# Patient Record
Sex: MALE | Race: BLACK OR AFRICAN AMERICAN | Employment: FULL TIME | URBAN - METROPOLITAN AREA
[De-identification: names, ages, dates, MRNs, and addresses within clinical notes are randomized per-mention and may not be internally consistent; named-entity substitution may affect disease eponyms.]

---

## 2020-07-12 ENCOUNTER — HOSPITAL ENCOUNTER (EMERGENCY)
Age: 35
Discharge: HOME | End: 2020-07-12
Attending: EMERGENCY MEDICINE
Payer: MEDICAID

## 2020-07-12 VITALS
TEMPERATURE: 97.8 F | DIASTOLIC BLOOD PRESSURE: 67 MMHG | HEIGHT: 68 IN | SYSTOLIC BLOOD PRESSURE: 115 MMHG | RESPIRATION RATE: 16 BRPM | OXYGEN SATURATION: 99 % | WEIGHT: 163 LBS | HEART RATE: 73 BPM | BODY MASS INDEX: 24.71 KG/M2

## 2020-07-12 DIAGNOSIS — L30.9 ECZEMA, UNSPECIFIED TYPE: Primary | ICD-10-CM

## 2020-07-12 DIAGNOSIS — L02.91 ABSCESS: ICD-10-CM

## 2020-07-12 PROCEDURE — 99282 EMERGENCY DEPT VISIT SF MDM: CPT

## 2020-07-12 RX ORDER — CEPHALEXIN 500 MG/1
1000 CAPSULE ORAL 2 TIMES DAILY
Qty: 40 CAP | Refills: 0 | Status: SHIPPED | OUTPATIENT
Start: 2020-07-12 | End: 2020-07-22

## 2020-07-12 RX ORDER — METHYLPREDNISOLONE 4 MG/1
TABLET ORAL
Qty: 1 DOSE PACK | Refills: 1 | Status: SHIPPED | OUTPATIENT
Start: 2020-07-12 | End: 2021-04-07 | Stop reason: CLARIF

## 2020-07-12 NOTE — DISCHARGE INSTRUCTIONS
Patient Education        Dermatitis: Care Instructions  Your Care Instructions  Dermatitis is the general name used for any rash or inflammation of the skin. Different kinds of dermatitis cause different kinds of rashes. Common causes of a rash include new medicines, plants (such as poison oak or poison ivy), heat, and stress. Certain illnesses can also cause a rash. An allergic reaction to something that touches your skin, such as latex, nickel, or poison ivy, is called contact dermatitis. Contact dermatitis may also be caused by something that irritates the skin, such as bleach, a chemical, or soap. These types of rashes cannot be spread from person to person. How long your rash will last depends on what caused it. Rashes may last a few days or months. Follow-up care is a key part of your treatment and safety. Be sure to make and go to all appointments, and call your doctor if you are having problems. It's also a good idea to know your test results and keep a list of the medicines you take. How can you care for yourself at home? · Do not scratch the rash. Cut your nails short, and file them smooth. Or wear gloves if this helps keep you from scratching. · Wash the area with water only. Pat dry. · Put cold, wet cloths on the rash to reduce itching. · Keep cool, and stay out of the sun. · Leave the rash open to the air as much as possible. · If the rash itches, use hydrocortisone cream. Follow the directions on the label. Calamine lotion may help for plant rashes. · Take an over-the-counter antihistamine, such as diphenhydramine (Benadryl) or loratadine (Claritin), to help calm the itching. Read and follow all instructions on the label. · If your doctor prescribed a cream, use it as directed. If your doctor prescribed medicine, take it exactly as directed. When should you call for help?    Call your doctor now or seek immediate medical care if:  · You have symptoms of infection, such as:  ? Increased pain, swelling, warmth, or redness. ? Red streaks leading from the area. ? Pus draining from the area. ? A fever. · You have joint pain along with the rash. Watch closely for changes in your health, and be sure to contact your doctor if:  · Your rash is changing or getting worse. · You are not getting better as expected. Where can you learn more? Go to http://www.gray.com/  Enter F270 in the search box to learn more about \"Dermatitis: Care Instructions. \"  Current as of: October 31, 2019               Content Version: 12.5  © 0927-7655 [employer]. Care instructions adapted under license by [employer] (which disclaims liability or warranty for this information). If you have questions about a medical condition or this instruction, always ask your healthcare professional. [address] any warranty or liability for your use of this information. Patient Education        Skin Abscess: Care Instructions  Your Care Instructions     A skin abscess is a bacterial infection that forms a pocket of pus. A boil is a kind of skin abscess. The doctor may have cut an opening in the abscess so that the pus can drain out. You may have gauze in the cut so that the abscess will stay open and keep draining. You may need antibiotics. You will need to follow up with your doctor to make sure the infection has gone away. The doctor has checked you carefully, but problems can develop later. If you notice any problems or new symptoms, get medical treatment right away. Follow-up care is a key part of your treatment and safety. Be sure to make and go to all appointments, and call your doctor if you are having problems. It's also a good idea to know your test results and keep a list of the medicines you take. How can you care for yourself at home? · Apply warm and dry compresses, a heating pad set on low, or a hot water bottle 3 or 4 times a day for pain. Keep a cloth between the heat source and your skin. · If your doctor prescribed antibiotics, take them as directed. Do not stop taking them just because you feel better. You need to take the full course of antibiotics. · Take pain medicines exactly as directed. ? If the doctor gave you a prescription medicine for pain, take it as prescribed. ? If you are not taking a prescription pain medicine, ask your doctor if you can take an over-the-counter medicine. · Keep your bandage clean and dry. Change the bandage whenever it gets wet or dirty, or at least one time a day. · If the abscess was packed with gauze:  ? Keep follow-up appointments to have the gauze changed or removed. If the doctor instructed you to remove the gauze, follow the instructions you were given for how to remove it. ? After the gauze is removed, soak the area in warm water for 15 to 20 minutes 2 times a day, until the wound closes. When should you call for help? Call your doctor now or seek immediate medical care if:  · You have signs of worsening infection, such as:  ? Increased pain, swelling, warmth, or redness. ? Red streaks leading from the infected skin. ? Pus draining from the wound. ? A fever. Watch closely for changes in your health, and be sure to contact your doctor if:  · You do not get better as expected. Where can you learn more? Go to http://audrey-maikol.info/  Enter B881 in the search box to learn more about \"Skin Abscess: Care Instructions. \"  Current as of: October 31, 2019               Content Version: 12.5  © 1861-7096 [employer]. Care instructions adapted under license by [employer] (which disclaims liability or warranty for this information). If you have questions about a medical condition or this instruction, always ask your healthcare professional. [address] any warranty or liability for your use of this information.

## 2020-07-12 NOTE — ED PROVIDER NOTES
EMERGENCY DEPARTMENT HISTORY AND PHYSICAL EXAM    Date: 7/12/2020  Patient Name: Idris Stephens    History of Presenting Illness     Chief Complaint   Patient presents with    Rash         History Provided By: Patient    Additional History (Context): Idris Stephens is a 27 y.o. male with eczema who presents with complaint of bilateral dorsal hands and extensor surface proximal forearm plaque-like eczematous flare. History of eczema says this is typical distribution in appearance. Not followed by dermatology since he no longer has a job with medical benefits. He is also complaining of a single postulated abscess to his right dorsal elbow. Says it is already started to drain. PCP: None    Current Outpatient Medications   Medication Sig Dispense Refill    methylPREDNISolone (Medrol, Bernardo,) 4 mg tablet Per dose pack instructions 1 Dose Pack 1    cephALEXin (Keflex) 500 mg capsule Take 2 Caps by mouth two (2) times a day for 10 days. 40 Cap 0       Past History     Past Medical History:  No past medical history on file. Past Surgical History:  Past Surgical History:   Procedure Laterality Date    HX SKIN BIOPSY      leg       Family History:  No family history on file. Social History:  Social History     Tobacco Use    Smoking status: Passive Smoke Exposure - Never Smoker   Substance Use Topics    Alcohol use: Not on file    Drug use: Not on file       Allergies:  No Known Allergies      Review of Systems   Review of Systems   Constitutional: Negative for fever. Skin: Positive for rash. All Other Systems Negative  Physical Exam     Vitals:    07/12/20 1317   BP: 115/67   Pulse: 73   Resp: 16   Temp: 97.8 °F (36.6 °C)   SpO2: 99%   Weight: 73.9 kg (163 lb)   Height: 5' 8\" (1.727 m)     Physical Exam  Vitals signs and nursing note reviewed. Constitutional:       General: He is not in acute distress. Appearance: He is well-developed.  He is not ill-appearing, toxic-appearing or diaphoretic. HENT:      Head: Normocephalic and atraumatic. Neck:      Musculoskeletal: Normal range of motion and neck supple. Thyroid: No thyromegaly. Vascular: No carotid bruit. Trachea: No tracheal deviation. Cardiovascular:      Rate and Rhythm: Normal rate and regular rhythm. Heart sounds: Normal heart sounds. No murmur. No friction rub. No gallop. Pulmonary:      Effort: Pulmonary effort is normal. No respiratory distress. Breath sounds: Normal breath sounds. No stridor. No wheezing or rales. Chest:      Chest wall: No tenderness. Abdominal:      General: There is no distension. Palpations: Abdomen is soft. There is no mass. Tenderness: There is no abdominal tenderness. There is no guarding or rebound. Musculoskeletal: Normal range of motion. Skin:     General: Skin is warm and dry. Coloration: Skin is not pale. Findings: Rash present. Comments: Plaque-like lesions, erythematous, to lateral dorsal hands and dorsal proximal forearms. On his right dorsal forearm there is a single postulated abscess minimal induration. No streaking. Neurological:      Mental Status: He is alert. Psychiatric:         Speech: Speech normal.         Behavior: Behavior normal.         Thought Content: Thought content normal.         Judgment: Judgment normal.            Diagnostic Study Results     Labs -   No results found for this or any previous visit (from the past 12 hour(s)). Radiologic Studies -   No orders to display     CT Results  (Last 48 hours)    None        CXR Results  (Last 48 hours)    None            Medical Decision Making   I am the first provider for this patient. I reviewed the vital signs, available nursing notes, past medical history, past surgical history, family history and social history. Vital Signs-Reviewed the patient's vital signs.     Procedures:  Procedures    Provider Notes (Medical Decision Making): Steroid pack with 1 refill given as well as oral Keflex. Put in  referral and recommend dermatology follow-up. MED RECONCILIATION:  No current facility-administered medications for this encounter. Current Outpatient Medications   Medication Sig    methylPREDNISolone (Medrol, Bernardo,) 4 mg tablet Per dose pack instructions    cephALEXin (Keflex) 500 mg capsule Take 2 Caps by mouth two (2) times a day for 10 days. Disposition:  home    DISCHARGE NOTE:   1:41 PM    Pt has been reexamined. Patient has no new complaints, changes, or physical findings. Care plan outlined and precautions discussed. Results of exam were reviewed with the patient. All medications were reviewed with the patient; will d/c home with see below. All of pt's questions and concerns were addressed. Patient was instructed and agrees to follow up with PCP, dermatology, , as well as to return to the ED upon further deterioration. Patient is ready to go home. Follow-up Information     Follow up With Specialties Details Why Contact Info    [facility] Cris Heaton  Schedule an appointment as soon as possible for a visit in 1 day  [address] [place] [address] Rd  Call in 1 day ask for assistance with follow up care [address] [place] [address]  Schedule an appointment as soon as possible for a visit in 1 day  [address] [id number]  [phone]    [facility] EMERGENCY DEPT Emergency Medicine  If symptoms worsen return immediately [address] [phone]  [phone]          Current Discharge Medication List      START taking these medications    Details   methylPREDNISolone (Medrol, Bernardo,) 4 mg tablet Per dose pack instructions  Qty: 1 Dose Pack, Refills: 1      cephALEXin (Keflex) 500 mg capsule Take 2 Caps by mouth two (2) times a day for 10 days.   Qty: 40 Cap, Refills: 0 Diagnosis     Clinical Impression:   1. Eczema, unspecified type    2.  Abscess

## 2020-07-12 NOTE — ED TRIAGE NOTES
Patient has a rash on his arms bilaterally, he does state that he does have dermatitis. He also has a \"bump\" on his right elbow x3 days that is getting bigger and is limiting his mobility due to the pain.

## 2020-07-14 NOTE — ED NOTES
was paged by physician on 7/12/2020 regarding patient needing assistance making dermatology appointment.  called patient by phone on 7/13/2020.  was able to do assessment over the phone. Patient stated he has insurance and he could use help scheduling dermatology appointment. Patient stated he was insured and currently working. Patient also stated he has split custody over his son and he will need appointments scheduled around his visitations.  also asked patient if he was under the care of a PCP. Patient stated no, he needed help with making a PCP appointment as well.  confirmed patient's insurance through registrar. Registrar was able to provide  with patient's insurance information.  called several dermatologist to try and have patient scheduled using insurance information provided for patient.  contacted [facility] and had patient scheduled on 7/29/2020 at 8:45 am with Dr. Bonnie Srinivasan.  also contacted [address] [place] [address] and had patient scheduled on 7/28/2020 at 10:00 am with .  called patient to give appointment details and information.  will also send off patient reminder letters on 7/14/2020 in outgoing mail. Patient confirmed both appointments and stated he will be able to make both.  gave patient 's contact information.

## 2020-12-28 PROCEDURE — 99283 EMERGENCY DEPT VISIT LOW MDM: CPT

## 2020-12-29 ENCOUNTER — HOSPITAL ENCOUNTER (EMERGENCY)
Age: 35
Discharge: HOME | End: 2020-12-29
Attending: EMERGENCY MEDICINE | Admitting: EMERGENCY MEDICINE
Payer: MEDICAID

## 2020-12-29 VITALS
DIASTOLIC BLOOD PRESSURE: 70 MMHG | OXYGEN SATURATION: 99 % | BODY MASS INDEX: 26.61 KG/M2 | HEART RATE: 51 BPM | RESPIRATION RATE: 18 BRPM | SYSTOLIC BLOOD PRESSURE: 121 MMHG | TEMPERATURE: 98.2 F | WEIGHT: 175 LBS

## 2020-12-29 DIAGNOSIS — L02.31 ABSCESS AND CELLULITIS OF GLUTEAL REGION: Primary | ICD-10-CM

## 2020-12-29 DIAGNOSIS — L03.317 ABSCESS AND CELLULITIS OF GLUTEAL REGION: Primary | ICD-10-CM

## 2020-12-29 PROCEDURE — 75810000289 HC I&D ABSCESS SIMP/COMP/MULT

## 2020-12-29 PROCEDURE — 74011250637 HC RX REV CODE- 250/637: Performed by: EMERGENCY MEDICINE

## 2020-12-29 RX ORDER — SULFAMETHOXAZOLE AND TRIMETHOPRIM 800; 160 MG/1; MG/1
2 TABLET ORAL 2 TIMES DAILY
Qty: 40 TAB | Refills: 0 | Status: SHIPPED | OUTPATIENT
Start: 2020-12-29 | End: 2021-01-08

## 2020-12-29 RX ORDER — SULFAMETHOXAZOLE AND TRIMETHOPRIM 800; 160 MG/1; MG/1
2 TABLET ORAL
Status: COMPLETED | OUTPATIENT
Start: 2020-12-29 | End: 2020-12-29

## 2020-12-29 RX ORDER — CEPHALEXIN 500 MG/1
500 CAPSULE ORAL 4 TIMES DAILY
Qty: 40 CAP | Refills: 0 | Status: SHIPPED | OUTPATIENT
Start: 2020-12-29 | End: 2021-01-08

## 2020-12-29 RX ORDER — CEPHALEXIN 250 MG/1
500 CAPSULE ORAL
Status: COMPLETED | OUTPATIENT
Start: 2020-12-29 | End: 2020-12-29

## 2020-12-29 RX ADMIN — CEPHALEXIN 500 MG: 250 CAPSULE ORAL at 02:00

## 2020-12-29 RX ADMIN — SULFAMETHOXAZOLE AND TRIMETHOPRIM 2 TABLET: 800; 160 TABLET ORAL at 01:59

## 2020-12-29 NOTE — ED TRIAGE NOTES
Patient A/O x 4, presented to ED with complaining of having an abscess to inside of right buttock x 1 week. Patient denies any drainage, fever, body aches, chills.

## 2020-12-29 NOTE — ED PROVIDER NOTES
Isa Myers is a 38 y.o. male with no significant past medical history coming in with an abscess to the right side of his buttocks 5 to 6 days. He states that its painful and much worse with palpation. Denies any drainage or discharge. Denies any fevers or chills. Denies any myalgias or other systemic symptoms. Denies any difficulty defecating. Patient has no other complaints at this time. No past medical history on file. Past Surgical History:   Procedure Laterality Date    HX SKIN BIOPSY      leg         No family history on file.     Social History     Socioeconomic History    Marital status: SINGLE     Spouse name: Not on file    Number of children: Not on file    Years of education: Not on file    Highest education level: Not on file   Occupational History    Not on file   Social Needs    Financial resource strain: Not on file    Food insecurity     Worry: Not on file     Inability: Not on file    Transportation needs     Medical: Not on file     Non-medical: Not on file   Tobacco Use    Smoking status: Passive Smoke Exposure - Never Smoker   Substance and Sexual Activity    Alcohol use: Not on file    Drug use: Not on file    Sexual activity: Not on file   Lifestyle    Physical activity     Days per week: Not on file     Minutes per session: Not on file    Stress: Not on file   Relationships    Social connections     Talks on phone: Not on file     Gets together: Not on file     Attends Zoroastrianism service: Not on file     Active member of club or organization: Not on file     Attends meetings of clubs or organizations: Not on file     Relationship status: Not on file    Intimate partner violence     Fear of current or ex partner: Not on file     Emotionally abused: Not on file     Physically abused: Not on file     Forced sexual activity: Not on file   Other Topics Concern    Not on file   Social History Narrative    Not on file         ALLERGIES: Patient has no known allergies. Review of Systems   Constitutional: Negative for chills and fever. HENT: Negative. Respiratory: Negative. Negative for shortness of breath. Cardiovascular: Negative. Negative for chest pain. Gastrointestinal: Negative. Negative for abdominal pain and vomiting. Genitourinary: Negative. Negative for dysuria. Musculoskeletal: Negative for myalgias. Skin: Positive for rash and wound. Neurological: Negative. Negative for weakness. All other systems reviewed and are negative. Vitals:    12/29/20 0031   BP: 121/70   Pulse: (!) 51   Resp: 18   Temp: 98.2 °F (36.8 °C)   SpO2: 99%   Weight: 79.4 kg (175 lb)            Physical Exam  Vitals signs reviewed. Constitutional:       Appearance: Normal appearance. HENT:      Head: Normocephalic and atraumatic. Eyes:      Extraocular Movements: Extraocular movements intact. Pupils: Pupils are equal, round, and reactive to light. Neck:      Musculoskeletal: Normal range of motion. Cardiovascular:      Rate and Rhythm: Normal rate and regular rhythm. Pulmonary:      Effort: Pulmonary effort is normal. No respiratory distress. Abdominal:      Palpations: Abdomen is soft. Tenderness: There is no abdominal tenderness. Genitourinary:     Comments: Patient has a 4 cm x 2 cm area of fluctuance, erythema, and induration over the right buttocks near the gluteal cleft. Does not appear to extend to the perineum or rectum. Musculoskeletal:         General: No deformity. Skin:     General: Skin is warm. Neurological:      General: No focal deficit present. Mental Status: He is alert and oriented to person, place, and time. MDM  Number of Diagnoses or Management Options  Abscess and cellulitis of gluteal region  Diagnosis management comments: Delaney Quevedo is a 38 y.o. male coming in with a abscess over his buttocks.   He will require incision and drainage and will put on antibiotics to cover for MRSA.  Patient is afebrile and well-appearing. No evidence of sepsis or systemic infection. I&D Abcess Complex    Date/Time: 12/29/2020 1:28 AM  Performed by: Mayela Jeffers MD  Authorized by: Mayela Jeffers MD     Consent:     Consent obtained:  Verbal and written    Consent given by:  Patient    Risks discussed:  Bleeding, incomplete drainage, pain and damage to other organs    Alternatives discussed:  No treatment and delayed treatment  Location:     Type:  Abscess    Size:  4 x 2 cm    Location:  Anogenital    Anogenital location:  Gluteal cleft  Pre-procedure details:     Skin preparation:  Antiseptic wash  Anesthesia (see MAR for exact dosages): Anesthesia method:  Local infiltration    Local anesthetic:  Lidocaine 1% w/o epi  Procedure type:     Complexity:  Complex  Procedure details:     Needle aspiration: no      Incision types:  Single straight    Incision depth:  Dermal    Scalpel blade:  11    Wound management:  Irrigated with saline, probed and deloculated and extensive cleaning    Drainage:  Bloody and purulent    Drainage amount:  Copious    Wound treatment:  Wound left open    Packing materials:  None  Post-procedure details:     Patient tolerance of procedure: Tolerated well, no immediate complications          Vitals:  Patient Vitals for the past 12 hrs:   Temp Pulse Resp BP SpO2   12/29/20 0031 98.2 °F (36.8 °C) (!) 51 18 121/70 99 %       Medications ordered:   Medications   trimethoprim-sulfamethoxazole (BACTRIM DS, SEPTRA DS) 160-800 mg per tablet 2 Tab (has no administration in time range)   cephALEXin (KEFLEX) capsule 500 mg (has no administration in time range)         Disposition:  Diagnosis:   1.  Abscess and cellulitis of gluteal region        Disposition: Discharge    Follow-up Information     Follow up With Specialties Details Why [address] [facility] EMERGENCY DEPT Emergency Medicine  As needed, If symptoms worsen [address] [facility] [address] [phone] Patient's Medications   Start Taking    CEPHALEXIN (KEFLEX) 500 MG CAPSULE    Take 1 Cap by mouth four (4) times daily for 10 days. TRIMETHOPRIM-SULFAMETHOXAZOLE (BACTRIM DS) 160-800 MG PER TABLET    Take 2 Tabs by mouth two (2) times a day for 10 days.    Continue Taking    METHYLPREDNISOLONE (MEDROL, CARLOS,) 4 MG TABLET    Per dose pack instructions   These Medications have changed    No medications on file   Stop Taking    No medications on file

## 2021-03-29 ENCOUNTER — APPOINTMENT (OUTPATIENT)
Dept: GENERAL RADIOLOGY | Age: 36
End: 2021-03-29
Attending: PHYSICIAN ASSISTANT
Payer: MEDICAID

## 2021-03-29 ENCOUNTER — HOSPITAL ENCOUNTER (EMERGENCY)
Age: 36
Discharge: HOME | End: 2021-03-29
Attending: EMERGENCY MEDICINE
Payer: MEDICAID

## 2021-03-29 DIAGNOSIS — S86.001A INJURY OF RIGHT ACHILLES TENDON, INITIAL ENCOUNTER: Primary | ICD-10-CM

## 2021-03-29 DIAGNOSIS — R03.0 ELEVATED BLOOD PRESSURE READING: ICD-10-CM

## 2021-03-29 PROCEDURE — 99281 EMR DPT VST MAYX REQ PHY/QHP: CPT

## 2021-03-29 PROCEDURE — 73610 X-RAY EXAM OF ANKLE: CPT

## 2021-03-30 VITALS
SYSTOLIC BLOOD PRESSURE: 128 MMHG | BODY MASS INDEX: 25.76 KG/M2 | HEART RATE: 53 BPM | HEIGHT: 68 IN | DIASTOLIC BLOOD PRESSURE: 80 MMHG | RESPIRATION RATE: 18 BRPM | OXYGEN SATURATION: 100 % | TEMPERATURE: 97.9 F | WEIGHT: 170 LBS

## 2021-03-30 RX ORDER — HYDROCODONE BITARTRATE AND ACETAMINOPHEN 5; 325 MG/1; MG/1
1 TABLET ORAL
Qty: 12 TAB | Refills: 0 | Status: SHIPPED | OUTPATIENT
Start: 2021-03-30 | End: 2021-04-02

## 2021-03-30 NOTE — ED PROVIDER NOTES
EMERGENCY DEPARTMENT HISTORY AND PHYSICAL EXAM    Date: 3/29/2021  Patient Name: Nevaeh Garnett    History of Presenting Illness     Chief Complaint   Patient presents with    Foot Pain         History Provided By: Patient    Chief Complaint: heel pain   Duration: 3 Hours  Timing:  Acute  Location: posterior right heel/ankle  Quality: Sharp  Severity: 7 out of 10  Modifying Factors: none   Associated Symptoms: denies any other associated signs or symptoms      Additional History (Context): Nevaeh Garnett is a 38 y.o. male with No significant past medical history who presents with moderate right heel pain s/p injury while playing basketball earlier this evening. Patient states he was jumping up to get a rebound and felt immediate pain at base of right heel radiating up to mid calf in doing so. He is uncertain if he heard/felt a pop but does not believe he rolled his ankle when landing. He states he is unable to put weight on his right foot whatsoever and states his right heel to mid right calf is extremely tender to touch. He denies injury or pain to his knee or ankle. He has no other associated symptoms or concerns at this time. PCP: None    Current Outpatient Medications   Medication Sig Dispense Refill    HYDROcodone-acetaminophen (NORCO) 5-325 mg per tablet Take 1 Tab by mouth every six (6) hours as needed for Pain for up to 3 days. Max Daily Amount: 4 Tabs. 12 Tab 0    methylPREDNISolone (Medrol, Bernardo,) 4 mg tablet Per dose pack instructions 1 Dose Pack 1       Past History     Past Medical History:  No past medical history on file. Past Surgical History:  Past Surgical History:   Procedure Laterality Date    HX SKIN BIOPSY      leg       Family History:  No family history on file.     Social History:  Social History     Tobacco Use    Smoking status: Passive Smoke Exposure - Never Smoker   Substance Use Topics    Alcohol use: Not on file    Drug use: Not on file       Allergies:  No Known Allergies      Review of Systems   Review of Systems   Constitutional: Negative for chills, fatigue and fever. HENT: Negative for congestion, rhinorrhea and sore throat. Respiratory: Negative for cough and shortness of breath. Cardiovascular: Negative for chest pain and palpitations. Gastrointestinal: Negative for abdominal pain, nausea and vomiting. Musculoskeletal: Positive for arthralgias. Negative for myalgias. Skin: Negative for rash and wound. Neurological: Negative for dizziness, weakness and numbness. Hematological: Negative for adenopathy. Does not bruise/bleed easily. Psychiatric/Behavioral: Negative for agitation and behavioral problems. All other systems reviewed and are negative. All Other Systems Negative  Physical Exam     Vitals:    03/29/21 2205 03/30/21 0026   BP: (!) 144/118 128/80   Pulse: (!) 53    Resp: 18    Temp: 97.9 °F (36.6 °C)    SpO2: 100%    Weight: 77.1 kg (170 lb)    Height: 5' 8\" (1.727 m)      Physical Exam  Vitals signs and nursing note reviewed. Constitutional:       Appearance: Normal appearance. He is normal weight. HENT:      Head: Normocephalic and atraumatic. Eyes:      General: No scleral icterus. Conjunctiva/sclera: Conjunctivae normal.      Pupils: Pupils are equal, round, and reactive to light. Neck:      Musculoskeletal: Normal range of motion and neck supple. Cardiovascular:      Rate and Rhythm: Normal rate and regular rhythm. Pulses: Normal pulses. Heart sounds: Normal heart sounds. Pulmonary:      Effort: Pulmonary effort is normal.      Breath sounds: Normal breath sounds. Abdominal:      General: Abdomen is flat. Bowel sounds are normal.      Palpations: Abdomen is soft. Musculoskeletal:         General: Swelling (right heel) and tenderness (overlying medial aspect of right heel) present. Comments: Patient is unable to invert or robin right foot.  Plantar flexion and dorsiflexion of right foot limited by extreme pain. Skin:     General: Skin is warm and dry. Capillary Refill: Capillary refill takes less than 2 seconds. Neurological:      General: No focal deficit present. Mental Status: He is alert and oriented to person, place, and time. Mental status is at baseline. Psychiatric:         Mood and Affect: Mood normal.         Behavior: Behavior normal.            Diagnostic Study Results     Labs -   No results found for this or any previous visit (from the past 12 hour(s)). Radiologic Studies -   XR ANKLE RT MIN 3 V    (Results Pending)     CT Results  (Last 48 hours)    None        CXR Results  (Last 48 hours)    None            Medical Decision Making   I am the first provider for this patient. I reviewed the vital signs, available nursing notes, past medical history, past surgical history, family history and social history. Vital Signs-Reviewed the patient's vital signs. Records Reviewed: Adeola Prieto PA-C     Procedures:  Procedures    Provider Notes (Medical Decision Making): Impression:  Achilles injury    X-ray negative for acute fx  Splint placed as pt likely has an achilles tendon injury. Ortho follow-up. Pt agrees. Adeola Prieto PA-C     MED RECONCILIATION:  No current facility-administered medications for this encounter. Current Outpatient Medications   Medication Sig    HYDROcodone-acetaminophen (NORCO) 5-325 mg per tablet Take 1 Tab by mouth every six (6) hours as needed for Pain for up to 3 days. Max Daily Amount: 4 Tabs.  methylPREDNISolone (Medrol, Bernardo,) 4 mg tablet Per dose pack instructions       Disposition:  D/c     DISCHARGE NOTE:   Patient is stable for discharge at this time. I have discussed all the findings from today's work up with the patient, including lab results and imaging. I have answered all questions. Rx for norco given. Rest and close follow-up with the orthopedist recommended this week.  Return to the ED immediately for any new or worsening symptoms. Adeola Prieto PA-C     Follow-up Information     Follow up With Specialties Details Why Contact Info    [facility]  Schedule an appointment as soon as possible for a visit in 1 week  [address]     [facility] EMERGENCY DEPT Emergency Medicine  As needed, If symptoms worsen [address] [facility] [address]  [phone]          Current Discharge Medication List      START taking these medications    Details   HYDROcodone-acetaminophen (NORCO) 5-325 mg per tablet Take 1 Tab by mouth every six (6) hours as needed for Pain for up to 3 days. Max Daily Amount: 4 Tabs. Qty: 12 Tab, Refills: 0    Associated Diagnoses: Injury of right Achilles tendon, initial encounter                 Diagnosis     Clinical Impression:   1. Injury of right Achilles tendon, initial encounter    2.  Elevated blood pressure reading

## 2021-03-30 NOTE — ED NOTES
Pt arrived back from grabbing dinner for son. Pt states that he is concerned that he injured his tendons in right foot while at the gym playing basketball around 1930.  Pt able to apply slight pressure to ambulate

## 2021-03-30 NOTE — DISCHARGE INSTRUCTIONS
[employer] Activation    Thank you for requesting access to [employer]. Please follow the instructions below to securely access and download your online medical record. [employer] allows you to send messages to your doctor, view your test results, renew your prescriptions, schedule appointments, and more. How Do I Sign Up? In your internet browser, go to [email or web address]  Click on the First Time User? Click Here link in the Sign In box. You will be redirect to the New Member Sign Up page. Enter your [employer] Access Code exactly as it appears below. You will not need to use this code after youve completed the sign-up process. If you do not sign up before the expiration date, you must request a new code. [employer] Access Code: [unfilled] (This is the date your [employer]os access code will )    Enter the last four digits of your Social Security Number (xxxx) and Date of Birth (mm/dd/yyyy) as indicated and click Submit. You will be taken to the next sign-up page. Create a [employer]os ID. This will be your [employer]os login ID and cannot be changed, so think of one that is secure and easy to remember. Create a [employer] password. You can change your password at any time. Enter your Password Reset Question and Answer. This can be used at a later time if you forget your password. Enter your e-mail address. You will receive e-mail notification when new information is available in [address] Click Sign Up. You can now view and download portions of your medical record. Click the [place] link to download a portable copy of your medical information. Additional Information    If you have questions, please visit the Frequently Asked Questions section of the [employer] website at [email or web address] Remember, [employer] is NOT to be used for urgent needs. For medical emergencies, dial 911.

## 2021-03-30 NOTE — ED NOTES
Pt told registration that he was going to get his son dinner and would retunr back.  Left before triage

## 2021-04-01 ENCOUNTER — OFFICE VISIT (OUTPATIENT)
Dept: ORTHOPEDIC SURGERY | Age: 36
End: 2021-04-01

## 2021-04-01 DIAGNOSIS — M76.61 TENDONITIS, ACHILLES, RIGHT: Primary | ICD-10-CM

## 2021-04-02 ENCOUNTER — OFFICE VISIT (OUTPATIENT)
Dept: ORTHOPEDIC SURGERY | Age: 36
End: 2021-04-02
Payer: MEDICAID

## 2021-04-02 VITALS
WEIGHT: 170 LBS | HEART RATE: 55 BPM | TEMPERATURE: 96.9 F | OXYGEN SATURATION: 100 % | HEIGHT: 68 IN | BODY MASS INDEX: 25.76 KG/M2

## 2021-04-02 DIAGNOSIS — S86.001A ACHILLES TENDON INJURY, RIGHT, INITIAL ENCOUNTER: ICD-10-CM

## 2021-04-02 DIAGNOSIS — M76.61 TENDONITIS, ACHILLES, RIGHT: ICD-10-CM

## 2021-04-02 DIAGNOSIS — M25.571 ACUTE RIGHT ANKLE PAIN: Primary | ICD-10-CM

## 2021-04-02 DIAGNOSIS — M25.571 ACUTE RIGHT ANKLE PAIN: ICD-10-CM

## 2021-04-02 PROCEDURE — 99204 OFFICE O/P NEW MOD 45 MIN: CPT | Performed by: PHYSICIAN ASSISTANT

## 2021-04-02 RX ORDER — ASPIRIN 325 MG
325 TABLET ORAL DAILY
Qty: 30 TAB | Refills: 0 | Status: SHIPPED | OUTPATIENT
Start: 2021-04-02 | End: 2021-05-03

## 2021-04-02 NOTE — H&P (VIEW-ONLY)
FOOT AND ANKLE HISTORY AND PHYSICAL        Patient: Nelson Cotto                   MRN: [id number]         SSN: [id number]  YOB: 1985                        AGE: 38 y.o. SEX: male      Patient scheduled for:  Excisional debridement and primary repair of right Achilles tendon rupture; possible graft jacket; possible autograft versus allograft tendon augmentation    Date of surgery: 4/8/21   Location of Surgery: [facility]   Surgeon: Dennis Patel. MD Ron  ANESTHESIA TYPE:  General, Popliteal block          ORDERS PLACED DURING H&P:    Orders Placed This Encounter    Generic Supply Order     Roll about knee roller or knee crutch    Generic Supply Order     Shower chair    Generic Supply Order     Tall medium CAM boot  With wedges    MRI ANKLE RT WO CONT     Standing Status:   Future     Standing Expiration Date:   5/2/2021     Order Specific Question:   Arthrogram study     Answer:   No     Order Specific Question:   Reason for Exam     Answer:   rule out achilles tendon rupture    XR CHEST PA LAT     Standing Status:   Future     Standing Expiration Date:   10/5/2021     Scheduling Instructions:      Please recheck to confirm if:      1. Patient has Allergry to IV contrast dye      2.  Patient is pregnant     Order Specific Question:   Reason for Exam     Answer:   Preop Risk stratificatiion    CBC WITH AUTOMATED DIFF     Standing Status:   Future     Standing Expiration Date:   7/3/1226    METABOLIC PANEL, COMPREHENSIVE     Standing Status:   Future     Standing Expiration Date:   6/2/2021    PTT     Standing Status:   Future     Standing Expiration Date:   4/3/2022    NOVEL CORONAVIRUS (COVID-19)     Standing Status:   Future     Standing Expiration Date:   4/2/2022     Scheduling Instructions:      1) Due to current limited availability of the COVID-19 PCR test, tests will be prioritized and may not be completed.              2) Order only if the test result will change clinical management or necessary for a return to mission-critical employment decision.              3) Print and instruct patient to adhere to CDC home isolation program. (Link Above)              4) Set up or refer patient for a monitoring program.              5) Have patient sign up for and leverage MyChart (if not previously done). Order Specific Question:   Is this test for diagnosis or screening? Answer:   Screening     Order Specific Question:   Symptomatic for COVID-19 as defined by CDC? Answer:   No     Order Specific Question:   Hospitalized for COVID-19? Answer:   No     Order Specific Question:   Admitted to ICU for COVID-19? Answer:   No     Order Specific Question:   Employed in healthcare setting? Answer:   Unknown     Order Specific Question:   Resident in a congregate (group) care setting? Answer:   Unknown     Order Specific Question:   Previously tested for COVID-19? Answer:   Unknown    VITAMIN D, 25 HYDROXY     Standing Status:   Future     Standing Expiration Date:   4/3/2022    EKG, 12 LEAD, SUBSEQUENT     Standing Status:   Future     Standing Expiration Date:   10/1/2021     Order Specific Question:   Reason for Exam:     Answer:   Pre op    aspirin (ASPIRIN) 325 mg tablet     Sig: Take 1 Tab by mouth daily. Dispense:  30 Tab     Refill:  0        Post Operative Prescriptions have not been prescribed during the H&P          HISTORY:     The patient was seen in the office today for a preoperative history and physical for an upcoming above listed surgery. The patient is a pleasant 38 y.o. male who has a history of a right hindfoot sustained on 3/29/21 while playing basketball. He states that he jumped up to rebound the ball and felt immediate pain at the base of his right heel radiating up to the mid calf when he landed. He is not sure if he heard/felt a pop and does not believe he rolled his ankle when he landed.  He is unable to WB on the RLE. The patient was seen at  ED on 3/29/21. X-rays of the right ankle were performed which I personally reviewed in the office today. My interpretation of the images is that there is no acute fracture. He was provided with crutches, Norco and instructed to follow up with ortho. The patient denies any other injuries. There are no reported changes in medications, allergies, social or family history. He reports no pain, but cannot bear any weight on his RLE. Patient was sent for an MRI to evaluate extent of right Achilles tendon tear/rupture. Patient would like to move forward with surgical repair pending MRI results. Due to the current findings, affected activity of daily living and continued pain and discomfort, surgical intervention is indicated. The alternatives, risks, and complications, including but not limited to infection, blood loss, need for blood transfusion, neurovascular damage, kaur-incisional numbness, subcutaneous hematoma, bone fracture, anesthetic complications, DVT, PE, death, RSD, postoperative stiffness and pain, possible surgical scar, delayed healing and nonhealing, reflexive sympathetic dystrophy, damage to blood vessels and nerves, need for more surgery, MI, and stroke, failure of hardware, gait disturbances  have been discussed. The patient understands and wishes to proceed with surgery. PAST MEDICAL HISTORY:     Past Medical History:   Diagnosis Date    Acne     Ankle sprain     Eczema     Meningitis        CURRENT MEDICATIONS:     Current Outpatient Medications   Medication Sig Dispense Refill    aspirin (ASPIRIN) 325 mg tablet Take 1 Tab by mouth daily. 30 Tab 0    HYDROcodone-acetaminophen (NORCO) 5-325 mg per tablet Take 1 Tab by mouth every six (6) hours as needed for Pain for up to 3 days. Max Daily Amount: 4 Tabs.  12 Tab 0    methylPREDNISolone (Medrol, Bernardo,) 4 mg tablet Per dose pack instructions 1 Dose Pack 1       ALLERGIES:     No Known Allergies      SURGICAL HISTORY:     Past Surgical History:   Procedure Laterality Date    HX SKIN BIOPSY      leg       SOCIAL HISTORY:     Social History     Socioeconomic History    Marital status: SINGLE     Spouse name: Not on file    Number of children: Not on file    Years of education: Not on file    Highest education level: Not on file   Tobacco Use    Smoking status: Passive Smoke Exposure - Never Smoker    Smokeless tobacco: Never Used       FAMILY HISTORY:     History reviewed. No pertinent family history. REVIEW OF SYSTEMS:     Negative for fevers, chills, chest pain, shortness of breath, weight loss, recent illness    General: Negative for fever and chills. No unexpected change in weight. Denies fatigue. No change in appetite. Skin: Negative for rash or itching. HEENT: Negative for congestion, sore throat, neck pain and neck stiffness. No change in vision or hearing. Hasn't noted any enlarged lymph nodes in the neck. Cardiovascular:  Negative for chest pain and palpitations. Has not noted pedal edema. Respiratory: Negative for cough, colds, sinus, hemoptysis, shortness of breath and wheezing. Gastrointestinal: Negative for nausea and vomiting, rectal bleeding, coffee ground emesis, abdominal pain, diarrhea and constipation. Genitourinary: Negative for dysuria, frequency urgency, or burning on micturition. No flank pain, no foul smelling urine, no difficulty with initiating urination. Hematological: Negative for bleeding or easy bruising. Musculoskeletal: Negative for arthralgias, back pain or neck pain. Neurological: Negative for dizziness, seizures or syncopal episodes. Denies headaches. Endocrine: Denies excessive thirst.  No heat/cold intolerance. Psychiatric: Negative for depression or insomnia.     PHYSICAL EXAMINATION:     VITALS:   Visit Vitals  Pulse (!) 55   Temp 96.9 °F (36.1 °C)   Ht 5' 8\" (1.727 m)   Wt 170 lb (77.1 kg) Comment: NWB   SpO2 100%   BMI 25.85 kg/m² GEN:  Well developed, well nourished 38 y.o. male in no acute distress. PSYCH: Alert an oriented to person, place and time. Mood, memory, affect, behavior and judgment normal. Speech normal in context and clarity. HEENT: Normocephalic and atraumatic. Eyes: Conjunctivae and EOM are normal.Pupils are equal, round, and reactive to light. External ear normal appearance, external nose normal appearing. Mouth/Throat: Oropharynx is clear and moist, able to handle oral secretions w/out difficulty, airway patent. NECK: Supple. Normal ROM, No lymphadenopathy. Trachea is midline. No bruising, swelling or deformity  RESP: Clear to auscultation bilaterally. No audible wheezing from mouth. No rales, rhonchi. Normal effort and breath sounds. No respiratory use of accessory muscles during breathing or distress  CHEST/ABDOMEN: Observation reveals: No audible wheezing from mouth. No accessory use of chest muscles during breathing. Non tender abdomen  CARDIO:  Normal rate, regular rhythm and normal heart sounds. No MGR. ABDOMEN: Non-tender, non-distended, normoactive bowel sounds in all four quadrants. There is no tenderness. There is no rebound and no guarding. BACK: No CVA or spinal tenderness  BREAST:  Deferred  PELVIC:    Deferred   RECTAL:  Deferred   :           Deferred  EXTREMITIES: EXAMINATION OF:   MUSCULOSKELETAL: EXAMINATION OF:     ANKLE/FOOT right     Gait: NWB  Tenderness: Moderate tenderness to the Achilles tendon. Cutaneous: Moderate bruising noted to skin in area of Achilles tendon. Otherwise, the skin is intact. No rash or skin lesions. No warmth, erythema or ecchymosis. No signs of infection or cellulitis present. Joint Motion: limited exam secondary to pain. There is pain-free range of motion of adjacent joints. Negative joint effusion. Joint / Tendon Stability: Not tested  Alignment: Forefoot, Midfoot, Hindfoot WNL.   Deformity: Palpable divot noted to Achilles tendon.  (+) Mandie Test  Neuro Motor/Sensory: NL/NL. Calf non-tender to palpation  Vascular: NL foot/ankle pulses  Lymphatics: No extremity lymphedema, No calf swelling, mild tenderness to calf muscles      RADIOGRAPHS & DIAGNOSTIC STUDIES:     XR Results (most recent):  Results from Hospital Encounter encounter on 03/29/21   XR ANKLE RT MIN 3 V    Narrative EXAMINATION: Right ankle 3 views    INDICATION: Right ankle injury playing basketball    COMPARISON: None    FINDINGS: 3 views of the right ankle obtained. No acute fracture or subluxation  identified. Joint spaces maintained. Mild medial malleolus tip spurring. No  focal suspicious soft tissue findings. Impression No acute osseous findings. LABS:     PENDING      ASSESSMENT:     Encounter Diagnoses     ICD-10-CM ICD-9-CM   1. Acute right ankle pain  M25.571 719.47     338.19   2. Achilles tendon injury, right, initial encounter  S86.001A 959.7   3. Tendonitis, Achilles, right  M76.61 726.71        PLAN:     Again, the alternatives, risks, and complications, as well as expected outcome were discussed. The patient understands and agrees to proceed with the above listed surgery pending completion of pre-operative labs and diagnostic studies. Follow-up and Dispositions    · Return in about 1 week (around 4/9/2021) for Scan review, follow up evaluation. The patient was counseled at length about the risks of diane Covid-19 during their perioperative period and any recovery window from their procedure. The patient was made aware that diane Covid-19  may worsen their prognosis for recovering from their procedure and lend to a higher morbidity and/or mortality risk. All material risks, benefits, and reasonable alternatives including postponing the procedure were discussed. The patient does wish to proceed with the procedure at this time. Nancy A. Rolinda Goodpasture [facility], TAJ, MORALES  4/2/2021  3:36 PM

## 2021-04-02 NOTE — H&P
FOOT AND ANKLE HISTORY AND PHYSICAL        Patient: Eloise Brothers                   MRN: [id number]         SSN: [id number]  YOB: 1985                        AGE: 38 y.o. SEX: male      Patient scheduled for:  Excisional debridement and primary repair of right Achilles tendon rupture; possible graft jacket; possible autograft versus allograft tendon augmentation    Date of surgery: 4/8/21   [facility]   Surgeon: Fabian Blank. MD Ron  ANESTHESIA TYPE:  General, Popliteal block          ORDERS PLACED DURING H&P:    Orders Placed This Encounter    Generic Supply Order     Roll about knee roller or knee crutch    Generic Supply Order     Shower chair    Generic Supply Order     Tall medium CAM boot  With wedges    MRI ANKLE RT WO CONT     Standing Status:   Future     Standing Expiration Date:   5/2/2021     Order Specific Question:   Arthrogram study     Answer:   No     Order Specific Question:   Reason for Exam     Answer:   rule out achilles tendon rupture    XR CHEST PA LAT     Standing Status:   Future     Standing Expiration Date:   10/5/2021     Scheduling Instructions:      Please recheck to confirm if:      1. Patient has Allergry to IV contrast dye      2.  Patient is pregnant     Order Specific Question:   Reason for Exam     Answer:   Preop Risk stratificatiion    CBC WITH AUTOMATED DIFF     Standing Status:   Future     Standing Expiration Date:   4/3/8908    METABOLIC PANEL, COMPREHENSIVE     Standing Status:   Future     Standing Expiration Date:   6/2/2021    PTT     Standing Status:   Future     Standing Expiration Date:   4/3/2022    NOVEL CORONAVIRUS (COVID-19)     Standing Status:   Future     Standing Expiration Date:   4/2/2022     Scheduling Instructions:      1) Due to current limited availability of the COVID-19 PCR test, tests will be prioritized and may not be completed.              2) Order only if the test result will change clinical management or necessary for a return to mission-critical employment decision.              3) Print and instruct patient to adhere to CDC home isolation program. (Link Above)              4) Set up or refer patient for a monitoring program.              5) Have patient sign up for and leverage MyChart (if not previously done). Order Specific Question:   Is this test for diagnosis or screening? Answer:   Screening     Order Specific Question:   Symptomatic for COVID-19 as defined by CDC? Answer:   No     Order Specific Question:   Hospitalized for COVID-19? Answer:   No     Order Specific Question:   Admitted to ICU for COVID-19? Answer:   No     Order Specific Question:   Employed in healthcare setting? Answer:   Unknown     Order Specific Question:   Resident in a congregate (group) care setting? Answer:   Unknown     Order Specific Question:   Previously tested for COVID-19? Answer:   Unknown    VITAMIN D, 25 HYDROXY     Standing Status:   Future     Standing Expiration Date:   4/3/2022    EKG, 12 LEAD, SUBSEQUENT     Standing Status:   Future     Standing Expiration Date:   10/1/2021     Order Specific Question:   Reason for Exam:     Answer:   Pre op    aspirin (ASPIRIN) 325 mg tablet     Sig: Take 1 Tab by mouth daily. Dispense:  30 Tab     Refill:  0        Post Operative Prescriptions have not been prescribed during the H&P          HISTORY:     The patient was seen in the office today for a preoperative history and physical for an upcoming above listed surgery. The patient is a pleasant 38 y.o. male who has a history of a right hindfoot sustained on 3/29/21 while playing basketball. He states that he jumped up to rebound the ball and felt immediate pain at the base of his right heel radiating up to the mid calf when he landed. He is not sure if he heard/felt a pop and does not believe he rolled his ankle when he landed.  He is unable to WB on the RLE. The patient was seen at  ED on 3/29/21. X-rays of the right ankle were performed which I personally reviewed in the office today. My interpretation of the images is that there is no acute fracture. He was provided with crutches, Norco and instructed to follow up with ortho. The patient denies any other injuries. There are no reported changes in medications, allergies, social or family history. He reports no pain, but cannot bear any weight on his RLE. Patient was sent for an MRI to evaluate extent of right Achilles tendon tear/rupture. Patient would like to move forward with surgical repair pending MRI results. Due to the current findings, affected activity of daily living and continued pain and discomfort, surgical intervention is indicated. The alternatives, risks, and complications, including but not limited to infection, blood loss, need for blood transfusion, neurovascular damage, kaur-incisional numbness, subcutaneous hematoma, bone fracture, anesthetic complications, DVT, PE, death, RSD, postoperative stiffness and pain, possible surgical scar, delayed healing and nonhealing, reflexive sympathetic dystrophy, damage to blood vessels and nerves, need for more surgery, MI, and stroke, failure of hardware, gait disturbances  have been discussed. The patient understands and wishes to proceed with surgery. PAST MEDICAL HISTORY:     Past Medical History:   Diagnosis Date    Acne     Ankle sprain     Eczema     Meningitis        CURRENT MEDICATIONS:     Current Outpatient Medications   Medication Sig Dispense Refill    aspirin (ASPIRIN) 325 mg tablet Take 1 Tab by mouth daily. 30 Tab 0    HYDROcodone-acetaminophen (NORCO) 5-325 mg per tablet Take 1 Tab by mouth every six (6) hours as needed for Pain for up to 3 days. Max Daily Amount: 4 Tabs.  12 Tab 0    methylPREDNISolone (Medrol, Bernardo,) 4 mg tablet Per dose pack instructions 1 Dose Pack 1       ALLERGIES:     No Known Allergies      SURGICAL HISTORY:     Past Surgical History:   Procedure Laterality Date    HX SKIN BIOPSY      leg       SOCIAL HISTORY:     Social History     Socioeconomic History    Marital status: SINGLE     Spouse name: Not on file    Number of children: Not on file    Years of education: Not on file    Highest education level: Not on file   Tobacco Use    Smoking status: Passive Smoke Exposure - Never Smoker    Smokeless tobacco: Never Used       FAMILY HISTORY:     History reviewed. No pertinent family history. REVIEW OF SYSTEMS:     Negative for fevers, chills, chest pain, shortness of breath, weight loss, recent illness    General: Negative for fever and chills. No unexpected change in weight. Denies fatigue. No change in appetite. Skin: Negative for rash or itching. HEENT: Negative for congestion, sore throat, neck pain and neck stiffness. No change in vision or hearing. Hasn't noted any enlarged lymph nodes in the neck. Cardiovascular:  Negative for chest pain and palpitations. Has not noted pedal edema. Respiratory: Negative for cough, colds, sinus, hemoptysis, shortness of breath and wheezing. Gastrointestinal: Negative for nausea and vomiting, rectal bleeding, coffee ground emesis, abdominal pain, diarrhea and constipation. Genitourinary: Negative for dysuria, frequency urgency, or burning on micturition. No flank pain, no foul smelling urine, no difficulty with initiating urination. Hematological: Negative for bleeding or easy bruising. Musculoskeletal: Negative for arthralgias, back pain or neck pain. Neurological: Negative for dizziness, seizures or syncopal episodes. Denies headaches. Endocrine: Denies excessive thirst.  No heat/cold intolerance. Psychiatric: Negative for depression or insomnia.     PHYSICAL EXAMINATION:     VITALS:   Visit Vitals  Pulse (!) 55   Temp 96.9 °F (36.1 °C)   Ht 5' 8\" (1.727 m)   Wt 170 lb (77.1 kg) Comment: NWB   SpO2 100%   BMI 25.85 kg/m² GEN:  Well developed, well nourished 38 y.o. male in no acute distress. PSYCH: Alert an oriented to person, place and time. Mood, memory, affect, behavior and judgment normal. Speech normal in context and clarity. HEENT: Normocephalic and atraumatic. Eyes: Conjunctivae and EOM are normal.Pupils are equal, round, and reactive to light. External ear normal appearance, external nose normal appearing. Mouth/Throat: Oropharynx is clear and moist, able to handle oral secretions w/out difficulty, airway patent. NECK: Supple. Normal ROM, No lymphadenopathy. Trachea is midline. No bruising, swelling or deformity  RESP: Clear to auscultation bilaterally. No audible wheezing from mouth. No rales, rhonchi. Normal effort and breath sounds. No respiratory use of accessory muscles during breathing or distress  CHEST/ABDOMEN: Observation reveals: No audible wheezing from mouth. No accessory use of chest muscles during breathing. Non tender abdomen  CARDIO:  Normal rate, regular rhythm and normal heart sounds. No MGR. ABDOMEN: Non-tender, non-distended, normoactive bowel sounds in all four quadrants. There is no tenderness. There is no rebound and no guarding. BACK: No CVA or spinal tenderness  BREAST:  Deferred  PELVIC:    Deferred   RECTAL:  Deferred   :           Deferred  EXTREMITIES: EXAMINATION OF:   MUSCULOSKELETAL: EXAMINATION OF:     ANKLE/FOOT right     Gait: NWB  Tenderness: Moderate tenderness to the Achilles tendon. Cutaneous: Moderate bruising noted to skin in area of Achilles tendon. Otherwise, the skin is intact. No rash or skin lesions. No warmth, erythema or ecchymosis. No signs of infection or cellulitis present. Joint Motion: limited exam secondary to pain. There is pain-free range of motion of adjacent joints. Negative joint effusion. Joint / Tendon Stability: Not tested  Alignment: Forefoot, Midfoot, Hindfoot WNL.   Deformity: Palpable divot noted to Achilles tendon.  (+) Mandie Test  Neuro Motor/Sensory: NL/NL. Calf non-tender to palpation  Vascular: NL foot/ankle pulses  Lymphatics: No extremity lymphedema, No calf swelling, mild tenderness to calf muscles      RADIOGRAPHS & DIAGNOSTIC STUDIES:     XR Results (most recent):  Results from Hospital Encounter encounter on 03/29/21   XR ANKLE RT MIN 3 V    Narrative EXAMINATION: Right ankle 3 views    INDICATION: Right ankle injury playing basketball    COMPARISON: None    FINDINGS: 3 views of the right ankle obtained. No acute fracture or subluxation  identified. Joint spaces maintained. Mild medial malleolus tip spurring. No  focal suspicious soft tissue findings. Impression No acute osseous findings. LABS:     PENDING      ASSESSMENT:     Encounter Diagnoses     ICD-10-CM ICD-9-CM   1. Acute right ankle pain  M25.571 719.47     338.19   2. Achilles tendon injury, right, initial encounter  S86.001A 959.7   3. Tendonitis, Achilles, right  M76.61 726.71        PLAN:     Again, the alternatives, risks, and complications, as well as expected outcome were discussed. The patient understands and agrees to proceed with the above listed surgery pending completion of pre-operative labs and diagnostic studies. Follow-up and Dispositions    · Return in about 1 week (around 4/9/2021) for Scan review, follow up evaluation. The patient was counseled at length about the risks of diane Covid-19 during their perioperative period and any recovery window from their procedure. The patient was made aware that diane Covid-19  may worsen their prognosis for recovering from their procedure and lend to a higher morbidity and/or mortality risk. All material risks, benefits, and reasonable alternatives including postponing the procedure were discussed. The patient does wish to proceed with the procedure at this time. Autumn Brown [facility], TAJ, PA-C  4/2/2021  3:36 PM

## 2021-04-02 NOTE — PATIENT INSTRUCTIONS
· Modify activity as directed. NO WEIGHT BEARING. USE CRUTCHES or KNEE ROLLER. Wear CAM boot with wedges · Follow RICE protocol: Rest, Ice (not directly on the skin) and Elevate · Take medication as directed, (if tolerated) · Take a daily aspirin as instructed · Take Prilosec or Pepcid while taking any antiinflammatory (if tolerated) medication · Maintain proper nutrition · Take a multivitamin, calcium + Vitamin D supplement daily (if tolerated) · Tall CAM boot with wedges provided · Order provided for knee roller · MRI of the right ankle ordered to evaluate for Achilles tendon rupture · Please follow up as instructed or sooner as needed If you have a reminder for a \"due or due soon\" health maintenance, please contact your primary care provider for follow-up on this health maintenance. Autumn Sal [facility], MPA, PA-C 
4/2/2021 
2:12 PM

## 2021-04-02 NOTE — PROGRESS NOTES
Patrick Batista (1985) is a 38 y.o. male, new patient, here for evaluation of the following chief complaint(s):    Chief Complaint   Patient presents with    Ankle Pain     right          ASSESSMENT & PLAN:       Diagnoses and all orders for this visit:    1. Acute right ankle pain  -     AMB SUPPLY ORDER  -     XR CHEST PA LAT; Future  -     EKG, 12 LEAD, SUBSEQUENT; Future  -     CBC WITH AUTOMATED DIFF; Future  -     METABOLIC PANEL, COMPREHENSIVE; Future  -     PTT; Future  -     NOVEL CORONAVIRUS (COVID-19); Future  -     VITAMIN D, 25 HYDROXY; Future  -     AMB SUPPLY ORDER    2. Achilles tendon injury, right, initial encounter  -     XR CHEST PA LAT; Future  -     EKG, 12 LEAD, SUBSEQUENT; Future  -     CBC WITH AUTOMATED DIFF; Future  -     METABOLIC PANEL, COMPREHENSIVE; Future  -     PTT; Future  -     NOVEL CORONAVIRUS (COVID-19); Future  -     VITAMIN D, 25 HYDROXY; Future  -     AMB SUPPLY ORDER  -     AMB SUPPLY ORDER    3. Tendonitis, Achilles, right  -     MRI ANKLE RT WO CONT; Future  -     EKG, 12 LEAD, SUBSEQUENT; Future  -     CBC WITH AUTOMATED DIFF; Future  -     METABOLIC PANEL, COMPREHENSIVE; Future  -     PTT; Future  -     NOVEL CORONAVIRUS (COVID-19); Future  -     VITAMIN D, 25 HYDROXY; Future    Other orders  -     aspirin (ASPIRIN) 325 mg tablet; Take 1 Tab by mouth daily. ICD-10-CM ICD-9-CM   1. Acute right ankle pain  M25.571 719.47     338.19   2. Achilles tendon injury, right, initial encounter  S86.001A 959.7   3.  Tendonitis, Achilles, right  M76.61 726.71       Orders Placed This Encounter    Generic Supply Order     Roll about knee roller or knee crutch    Generic Supply Order     Shower chair    Generic Supply Order     Tall medium CAM boot  With wedges    MRI ANKLE RT WO CONT     Standing Status:   Future     Standing Expiration Date:   5/2/2021     Order Specific Question:   Arthrogram study     Answer:   No     Order Specific Question: Reason for Exam     Answer:   rule out achilles tendon rupture    XR CHEST PA LAT     Standing Status:   Future     Standing Expiration Date:   10/5/2021     Scheduling Instructions:      Please recheck to confirm if:      1. Patient has Allergry to IV contrast dye      2. Patient is pregnant     Order Specific Question:   Reason for Exam     Answer:   Preop Risk stratificatiion    CBC WITH AUTOMATED DIFF     Standing Status:   Future     Standing Expiration Date:   7/0/5311    METABOLIC PANEL, COMPREHENSIVE     Standing Status:   Future     Standing Expiration Date:   6/2/2021    PTT     Standing Status:   Future     Standing Expiration Date:   4/3/2022    NOVEL CORONAVIRUS (COVID-19)     Standing Status:   Future     Standing Expiration Date:   4/2/2022     Scheduling Instructions:      1) Due to current limited availability of the COVID-19 PCR test, tests will be prioritized and may not be completed.              2) Order only if the test result will change clinical management or necessary for a return to mission-critical employment decision.              3) Print and instruct patient to adhere to CDC home isolation program. (Link Above)              4) Set up or refer patient for a monitoring program.              5) Have patient sign up for and leverage [employer] (if not previously done). Order Specific Question:   Is this test for diagnosis or screening? Answer:   Screening     Order Specific Question:   Symptomatic for COVID-19 as defined by CDC? Answer:   No     Order Specific Question:   Hospitalized for COVID-19? Answer:   No     Order Specific Question:   Admitted to ICU for COVID-19? Answer:   No     Order Specific Question:   Employed in healthcare setting? Answer:   Unknown     Order Specific Question:   Resident in a congregate (group) care setting? Answer:   Unknown     Order Specific Question:   Previously tested for COVID-19?      Answer:   Unknown    VITAMIN D, 25 HYDROXY     Standing Status:   Future     Standing Expiration Date:   4/3/2022    EKG, 12 LEAD, SUBSEQUENT     Standing Status:   Future     Standing Expiration Date:   10/1/2021     Order Specific Question:   Reason for Exam:     Answer:   Pre op    aspirin (ASPIRIN) 325 mg tablet     Sig: Take 1 Tab by mouth daily. Dispense:  30 Tab     Refill:  0        Patient Instructions        · Modify activity as directed. NO WEIGHT BEARING. USE CRUTCHES or KNEE ROLLER. Wear CAM boot with wedges  · Follow RICE protocol: Rest, Ice (not directly on the skin) and Elevate  · Take medication as directed, (if tolerated)  · Take a daily aspirin as instructed   · Take Prilosec or Pepcid while taking any antiinflammatory (if tolerated) medication  · Maintain proper nutrition   · Take a multivitamin, calcium + Vitamin D supplement daily (if tolerated)  · Tall CAM boot with wedges provided  · Order provided for knee roller  · MRI of the right ankle ordered to evaluate for Achilles tendon rupture  · Please follow up as instructed or sooner as needed      Follow-up and Dispositions    · Return in about 1 week (around 4/9/2021) for Scan review, follow up evaluation. History and Physical completed during this encounter    Dr. Álvaro Hadley has been consulted regarding the patient during this visit and he agrees with the assessment and plan    Patient expresses understanding of the diagnosis and treatment plan. Patient education has been provided. Debi Luis may have a reminder for a \"due or due soon\" health maintenance. I have asked that he contact his primary care provider for follow-up on this health maintenance.  was reviewed by Urvashi Casey PA-C on 4/2/2021. SUBJECTIVE & OBJECTIVE:     HPI    The patient reports that he injured his right hindfoot on 3/29/21 while playing basketball.  He states that he jumped up to rebound the ball and felt immediate pain at the base of his right heel radiating up to the mid calf when he landed. He is not sure if he heard/felt a pop and does not believe he rolled his ankle when he landed. He is unable to WB on the RLE. The patient was seen at  ED on 3/29/21. X-rays of the right ankle were performed which I personally reviewed in the office today. My interpretation of the images is that there is no acute fracture. He was provided with crutches, Norco and instructed to follow up with ortho. The patient denies any other injuries. There are no reported changes in medications, allergies, social or family history. He reports no pain, but cannot bear any weight on his One Natali Kleni  has a past medical history of Acne, Ankle sprain, Eczema, and Meningitis. . Other than previously noted, patient reports no changes in medications, allergies, social or family history. REVIEW OF SYSTEMS:                  All Below are Negative except as indicated in the HPI: See HPI                Constitutional: negative for fever, chills, night sweats and unexpected weight loss and malaise/fatigue              HEENT: Negative. No hoarseness, no double vision              Respiratory: Negative.  No difficulty breathing, No SOB              Cardiovascular: negative for chest pain, claudication, RENTERIA. (-) Leg swelling               Gastrointestinal: Negative for pain, Blood in stool, incontinence, pelvic pain, N/V/C/D              Genitourinary: No saddle anesthesia, pelvic pain, blood in urine, incontinence, dysuria               Neurological: Negative for dizziness and weakness, visual changes, confusion, headaches, seizures               Phychiatric/Behavioral: Negative for depression, memory loss, substance abuse               Extremities: Negative for hair changes, rash, or skin lesion changes              Hematologic: Negative for bleeding problems, bruising, pallor or swollen lymph nodes              Peripheral Vascular: No calf pain, no circulation deficits Musculoskeletal: As per HPI above      PHYSICAL EXAM:        Visit Vitals  Pulse (!) 55   Temp 96.9 °F (36.1 °C)   Ht 5' 8\" (1.727 m)   Wt 170 lb (77.1 kg) Comment: NWB   SpO2 100%   BMI 25.85 kg/m²         Constitutional: [x] Alert, cooperative, appears well-developed and well-nourished [x] No apparent distress      [] Abnormal - Body mass index is 25.85 kg/m². makes the treatment plan more complex     Mental status: [x] Alert and awake  [x] Oriented to person/place/time   [x] Normal mood/behavior/speech   [x] Normal dress/motor activity/thought processes/memory      [x] Able to follow commands    [] Abnormal - Dementia    Eyes:   EOM    [x]  Normal    [] Abnormal -   Sclera  [x]  Normal    [] Abnormal -          Discharge [x]  None visible   [] Abnormal -     HENT: [x] Normocephalic, atraumatic  [] Abnormal -   [x] Mouth/Throat: Mucous membranes are moist    External Ears [x] Normal, hearing intact  [] Abnormal -    Neck: [x] Supple. No visualized mass, normal ROM [] Abnormal -     Pulmonary/Chest: [x] Respiratory effort normal   [x] No visualized signs of difficulty breathing or respiratory distress        [] Abnormal -      Cardiovascular/    [x] Normal pulses to each foot  [] Abnormal -   Peripheral Vascular:    Neurological:        [x] No Facial Asymmetry (Cranial nerve 7 motor function) (limited exam due to video visit) [x] No gross defects         [x] No gaze palsy        [] Abnormal -          Skin:        [x] No significant exanthematous lesions or discoloration noted on facial skin or visible areas       [] Abnormal -            Psychiatric:       [x] Normal Affect, mood, judgement, behavior, conduct [] Abnormal -        [x] No Hallucinations      Musculoskeletal:   [x] Normal gait with no signs of ataxia         [x] Normal range of motion of neck        [] Abnormal -     MUSCULOSKELETAL: EXAMINATION OF:     ANKLE/FOOT right     Gait: NWB  Tenderness:  Moderate tenderness to the substance of the Achilles tendon. Cutaneous: Moderate bruising noted to skin in area of Achilles tendon. Otherwise, the skin is intact. No rash or skin lesions. No warmth, erythema or ecchymosis. No signs of infection or cellulitis present. Joint Motion: limited exam secondary to pain. There is pain-free range of motion of adjacent joints. Negative joint effusion. Joint / Tendon Stability: Not tested  Alignment: Forefoot, Midfoot, Hindfoot WNL. Deformity: Palpable divot noted to Achilles tendon.  (+) Powers Test  Neuro Motor/Sensory: NL/NL. Calf non-tender to palpation  Vascular: NL foot/ankle pulses  Lymphatics: No extremity lymphedema, No calf swelling, mild tenderness to calf muscles      An electronic signature was used to authenticate this note. -- Pepe Urrutia. [address] [facility], MORALES VANG  4/2/2021      Disclaimer: Sections of this note may have been dictated utilizing voice recognition software, which may have resulted in some phonetic based errors in grammar and contents. Even though attempts were made to correct all the mistakes, some may have been missed, and remained in the body of the document. If questions arise, please contact our department. RADIOGRAPHS & DIAGNOSTIC STUDIES     XR Results (most recent):  Results from Hospital Encounter encounter on 03/29/21   XR ANKLE RT MIN 3 V    Narrative EXAMINATION: Right ankle 3 views    INDICATION: Right ankle injury playing basketball    COMPARISON: None    FINDINGS: 3 views of the right ankle obtained. No acute fracture or subluxation  identified. Joint spaces maintained. Mild medial malleolus tip spurring. No  focal suspicious soft tissue findings. Impression No acute osseous findings.          SURGERY ORDER WILL BE FOR:       Admit: Outpatient [facility]    Pre-op diagnosis: Right Achilles tendon rupture ICD: S86.011A    Procedure: 08423; 35713    Anesthesia: General with Popliteal Nerve Block    Equipment: TBD    Timeline for Procedure: Elective    Pre Admission Testing: Normal Labs: CBC, CMP, PT/INR,PTT, CXR, and EKG, COVID-19    Clearances: NONE    Is Patient on Blood Thinners?: Yes - Aspirin

## 2021-04-03 DIAGNOSIS — M25.571 ACUTE RIGHT ANKLE PAIN: ICD-10-CM

## 2021-04-03 DIAGNOSIS — M76.61 TENDONITIS, ACHILLES, RIGHT: ICD-10-CM

## 2021-04-03 DIAGNOSIS — S86.001A ACHILLES TENDON INJURY, RIGHT, INITIAL ENCOUNTER: ICD-10-CM

## 2021-04-06 ENCOUNTER — DOCUMENTATION ONLY (OUTPATIENT)
Dept: ORTHOPEDIC SURGERY | Age: 36
End: 2021-04-06

## 2021-04-06 ENCOUNTER — OFFICE VISIT (OUTPATIENT)
Dept: ORTHOPEDIC SURGERY | Age: 36
End: 2021-04-06

## 2021-04-06 VITALS
RESPIRATION RATE: 16 BRPM | WEIGHT: 170 LBS | HEIGHT: 68 IN | HEART RATE: 67 BPM | TEMPERATURE: 97.7 F | OXYGEN SATURATION: 99 % | BODY MASS INDEX: 25.76 KG/M2

## 2021-04-06 DIAGNOSIS — S86.011D RUPTURE OF RIGHT ACHILLES TENDON, SUBSEQUENT ENCOUNTER: Primary | ICD-10-CM

## 2021-04-06 DIAGNOSIS — Z01.818 PRE-OPERATIVE EXAMINATION: ICD-10-CM

## 2021-04-06 DIAGNOSIS — S86.011D RUPTURE OF RIGHT ACHILLES TENDON, SUBSEQUENT ENCOUNTER: ICD-10-CM

## 2021-04-06 NOTE — PROGRESS NOTES
Patient could not get into a [facility] facility in time so he was sent to [address] [place] [address]  Scheduled for 04/07/21 @ 6:00pm arrival 5:45pm [facility]   Patient was given a copy of the order and authorization. It was all also faxed to [facility] at [phone] confirmation sent to scanning.

## 2021-04-06 NOTE — PROGRESS NOTES
Patient presents today for skin check. MRI of the right ankle is scheduled for 4/7/21 at MRI[facility]. We will call the patient with the results after the MRI has been completed. Patient tentatively scheduled for surgery on 4/8/21 at . Autumn Oro [facility], MPA, PA-C  4/6/2021  2:39 PM

## 2021-04-07 ENCOUNTER — ANESTHESIA EVENT (OUTPATIENT)
Dept: SURGERY | Age: 36
End: 2021-04-07
Payer: MEDICAID

## 2021-04-07 RX ORDER — HYDROCODONE BITARTRATE AND ACETAMINOPHEN 7.5; 325 MG/1; MG/1
1 TABLET ORAL
COMMUNITY

## 2021-04-08 ENCOUNTER — HOSPITAL ENCOUNTER (OUTPATIENT)
Age: 36
Setting detail: OUTPATIENT SURGERY
Discharge: HOME | End: 2021-04-08
Attending: ORTHOPAEDIC SURGERY | Admitting: ORTHOPAEDIC SURGERY
Payer: MEDICAID

## 2021-04-08 ENCOUNTER — ANESTHESIA (OUTPATIENT)
Dept: SURGERY | Age: 36
End: 2021-04-08
Payer: MEDICAID

## 2021-04-08 VITALS
HEART RATE: 75 BPM | OXYGEN SATURATION: 98 % | HEIGHT: 69 IN | SYSTOLIC BLOOD PRESSURE: 114 MMHG | BODY MASS INDEX: 24.29 KG/M2 | WEIGHT: 164 LBS | TEMPERATURE: 97.9 F | DIASTOLIC BLOOD PRESSURE: 69 MMHG | RESPIRATION RATE: 16 BRPM

## 2021-04-08 DIAGNOSIS — S86.011A STRAIN OF RIGHT ACHILLES TENDON, INITIAL ENCOUNTER: ICD-10-CM

## 2021-04-08 LAB
ALBUMIN SERPL-MCNC: 4.1 G/DL (ref 3.4–5)
ALBUMIN/GLOB SERPL: 1.1 {RATIO} (ref 0.8–1.7)
ALP SERPL-CCNC: 42 U/L (ref 45–117)
ALT SERPL-CCNC: 27 U/L (ref 16–61)
AMPHET UR QL SCN: NEGATIVE
ANION GAP SERPL CALC-SCNC: 5 MMOL/L (ref 3–18)
AST SERPL-CCNC: 15 U/L (ref 10–38)
ATRIAL RATE: 57 BPM
BARBITURATES UR QL SCN: NEGATIVE
BASOPHILS # BLD: 0 K/UL (ref 0–0.1)
BASOPHILS NFR BLD: 0 % (ref 0–2)
BENZODIAZ UR QL: NEGATIVE
BILIRUB SERPL-MCNC: 0.2 MG/DL (ref 0.2–1)
BUN SERPL-MCNC: 13 MG/DL (ref 7–18)
BUN/CREAT SERPL: 12 (ref 12–20)
CALCIUM SERPL-MCNC: 8.8 MG/DL (ref 8.5–10.1)
CALCULATED P AXIS, ECG09: 39 DEGREES
CALCULATED R AXIS, ECG10: 62 DEGREES
CALCULATED T AXIS, ECG11: 31 DEGREES
CANNABINOIDS UR QL SCN: POSITIVE
CHLORIDE SERPL-SCNC: 108 MMOL/L (ref 100–111)
CO2 SERPL-SCNC: 29 MMOL/L (ref 21–32)
COCAINE UR QL SCN: NEGATIVE
COVID-19 RAPID TEST, COVR: NOT DETECTED
CREAT SERPL-MCNC: 1.06 MG/DL (ref 0.6–1.3)
DIAGNOSIS, 93000: NORMAL
DIFFERENTIAL METHOD BLD: NORMAL
EOSINOPHIL # BLD: 0.2 K/UL (ref 0–0.4)
EOSINOPHIL NFR BLD: 3 % (ref 0–5)
ERYTHROCYTE [DISTWIDTH] IN BLOOD BY AUTOMATED COUNT: 13.5 % (ref 11.6–14.5)
GLOBULIN SER CALC-MCNC: 3.9 G/DL (ref 2–4)
GLUCOSE SERPL-MCNC: 90 MG/DL (ref 74–99)
HCT VFR BLD AUTO: 42.3 % (ref 36–48)
HDSCOM,HDSCOM: ABNORMAL
HGB BLD-MCNC: 14.3 G/DL (ref 13–16)
INR PPP: 1 (ref 0.8–1.2)
LYMPHOCYTES # BLD: 2.3 K/UL (ref 0.9–3.6)
LYMPHOCYTES NFR BLD: 33 % (ref 21–52)
MCH RBC QN AUTO: 30.5 PG (ref 24–34)
MCHC RBC AUTO-ENTMCNC: 33.8 G/DL (ref 31–37)
MCV RBC AUTO: 90.2 FL (ref 74–97)
METHADONE UR QL: NEGATIVE
MONOCYTES # BLD: 0.5 K/UL (ref 0.05–1.2)
MONOCYTES NFR BLD: 7 % (ref 3–10)
NEUTS SEG # BLD: 3.8 K/UL (ref 1.8–8)
NEUTS SEG NFR BLD: 56 % (ref 40–73)
OPIATES UR QL: NEGATIVE
P-R INTERVAL, ECG05: 194 MS
PCP UR QL: NEGATIVE
PLATELET # BLD AUTO: 243 K/UL (ref 135–420)
PMV BLD AUTO: 9.8 FL (ref 9.2–11.8)
POTASSIUM SERPL-SCNC: 3.7 MMOL/L (ref 3.5–5.5)
PROT SERPL-MCNC: 8 G/DL (ref 6.4–8.2)
PROTHROMBIN TIME: 13.3 SEC (ref 11.5–15.2)
Q-T INTERVAL, ECG07: 416 MS
QRS DURATION, ECG06: 74 MS
QTC CALCULATION (BEZET), ECG08: 404 MS
RBC # BLD AUTO: 4.69 M/UL (ref 4.35–5.65)
SARS-COV-2, COV2: NORMAL
SODIUM SERPL-SCNC: 142 MMOL/L (ref 136–145)
SOURCE, COVRS: NORMAL
VENTRICULAR RATE, ECG03: 57 BPM
WBC # BLD AUTO: 6.8 K/UL (ref 4.6–13.2)

## 2021-04-08 PROCEDURE — 27650 REPAIR ACHILLES TENDON: CPT | Performed by: PHYSICIAN ASSISTANT

## 2021-04-08 PROCEDURE — 64450 NJX AA&/STRD OTHER PN/BRANCH: CPT | Performed by: ANESTHESIOLOGY

## 2021-04-08 PROCEDURE — 77030002922 HC SUT FBRWRE ARTH -B: Performed by: ORTHOPAEDIC SURGERY

## 2021-04-08 PROCEDURE — 74011250636 HC RX REV CODE- 250/636: Performed by: NURSE ANESTHETIST, CERTIFIED REGISTERED

## 2021-04-08 PROCEDURE — 80053 COMPREHEN METABOLIC PANEL: CPT

## 2021-04-08 PROCEDURE — 76210000021 HC REC RM PH II 0.5 TO 1 HR: Performed by: ORTHOPAEDIC SURGERY

## 2021-04-08 PROCEDURE — 85025 COMPLETE CBC W/AUTO DIFF WBC: CPT

## 2021-04-08 PROCEDURE — 74011000250 HC RX REV CODE- 250: Performed by: NURSE ANESTHETIST, CERTIFIED REGISTERED

## 2021-04-08 PROCEDURE — 76942 ECHO GUIDE FOR BIOPSY: CPT | Performed by: ANESTHESIOLOGY

## 2021-04-08 PROCEDURE — 76060000034 HC ANESTHESIA 1.5 TO 2 HR: Performed by: ORTHOPAEDIC SURGERY

## 2021-04-08 PROCEDURE — 01472 ANES RPR RPT ACHILLES TENDON: CPT | Performed by: ANESTHESIOLOGY

## 2021-04-08 PROCEDURE — 77030013079 HC BLNKT BAIR HGGR 3M -A: Performed by: ANESTHESIOLOGY

## 2021-04-08 PROCEDURE — 2709999900 HC NON-CHARGEABLE SUPPLY: Performed by: ORTHOPAEDIC SURGERY

## 2021-04-08 PROCEDURE — 77030008683 HC TU ET CUF COVD -A: Performed by: ANESTHESIOLOGY

## 2021-04-08 PROCEDURE — 77030026438 HC STYL ET INTUB CARD -A: Performed by: ANESTHESIOLOGY

## 2021-04-08 PROCEDURE — 77030031139 HC SUT VCRL2 J&J -A: Performed by: ORTHOPAEDIC SURGERY

## 2021-04-08 PROCEDURE — 85610 PROTHROMBIN TIME: CPT

## 2021-04-08 PROCEDURE — 76010000153 HC OR TIME 1.5 TO 2 HR: Performed by: ORTHOPAEDIC SURGERY

## 2021-04-08 PROCEDURE — 77030040361 HC SLV COMPR DVT MDII -B: Performed by: ORTHOPAEDIC SURGERY

## 2021-04-08 PROCEDURE — 77030020753 HC CUF TRNQT 1BLA STRY -B: Performed by: ORTHOPAEDIC SURGERY

## 2021-04-08 PROCEDURE — 01472 ANES RPR RPT ACHILLES TENDON: CPT | Performed by: NURSE ANESTHETIST, CERTIFIED REGISTERED

## 2021-04-08 PROCEDURE — 77030040922 HC BLNKT HYPOTHRM STRY -A: Performed by: ORTHOPAEDIC SURGERY

## 2021-04-08 PROCEDURE — 77030006773 HC BLD SAW OSC BRSM -A: Performed by: ORTHOPAEDIC SURGERY

## 2021-04-08 PROCEDURE — 27650 REPAIR ACHILLES TENDON: CPT | Performed by: ORTHOPAEDIC SURGERY

## 2021-04-08 PROCEDURE — 77030003601 HC NDL NRV BLK BBMI -A: Performed by: ORTHOPAEDIC SURGERY

## 2021-04-08 PROCEDURE — 76210000006 HC OR PH I REC 0.5 TO 1 HR: Performed by: ORTHOPAEDIC SURGERY

## 2021-04-08 PROCEDURE — 77030002916 HC SUT ETHLN J&J -A: Performed by: ORTHOPAEDIC SURGERY

## 2021-04-08 PROCEDURE — 74011250637 HC RX REV CODE- 250/637: Performed by: NURSE ANESTHETIST, CERTIFIED REGISTERED

## 2021-04-08 PROCEDURE — 77030038692 HC WND DEB SYS IRMX -B: Performed by: ORTHOPAEDIC SURGERY

## 2021-04-08 PROCEDURE — 74011250636 HC RX REV CODE- 250/636: Performed by: PHYSICIAN ASSISTANT

## 2021-04-08 PROCEDURE — 74011250636 HC RX REV CODE- 250/636: Performed by: ANESTHESIOLOGY

## 2021-04-08 PROCEDURE — 87635 SARS-COV-2 COVID-19 AMP PRB: CPT

## 2021-04-08 PROCEDURE — 93005 ELECTROCARDIOGRAM TRACING: CPT

## 2021-04-08 PROCEDURE — 80307 DRUG TEST PRSMV CHEM ANLYZR: CPT

## 2021-04-08 RX ORDER — HYDROCODONE BITARTRATE AND ACETAMINOPHEN 7.5; 325 MG/1; MG/1
2 TABLET ORAL
Qty: 30 TAB | Refills: 0 | Status: SHIPPED | OUTPATIENT
Start: 2021-04-08 | End: 2021-04-15

## 2021-04-08 RX ORDER — ROPIVACAINE HYDROCHLORIDE 2 MG/ML
30 INJECTION, SOLUTION EPIDURAL; INFILTRATION; PERINEURAL
Status: DISCONTINUED | OUTPATIENT
Start: 2021-04-08 | End: 2021-04-08 | Stop reason: HOSPADM

## 2021-04-08 RX ORDER — FENTANYL CITRATE 50 UG/ML
INJECTION, SOLUTION INTRAMUSCULAR; INTRAVENOUS AS NEEDED
Status: DISCONTINUED | OUTPATIENT
Start: 2021-04-08 | End: 2021-04-08 | Stop reason: HOSPADM

## 2021-04-08 RX ORDER — LIDOCAINE HYDROCHLORIDE 20 MG/ML
INJECTION, SOLUTION EPIDURAL; INFILTRATION; INTRACAUDAL; PERINEURAL AS NEEDED
Status: DISCONTINUED | OUTPATIENT
Start: 2021-04-08 | End: 2021-04-08 | Stop reason: HOSPADM

## 2021-04-08 RX ORDER — SODIUM CHLORIDE, SODIUM LACTATE, POTASSIUM CHLORIDE, CALCIUM CHLORIDE 600; 310; 30; 20 MG/100ML; MG/100ML; MG/100ML; MG/100ML
75 INJECTION, SOLUTION INTRAVENOUS CONTINUOUS
Status: DISCONTINUED | OUTPATIENT
Start: 2021-04-08 | End: 2021-04-08 | Stop reason: SDUPTHER

## 2021-04-08 RX ORDER — MIDAZOLAM HYDROCHLORIDE 1 MG/ML
2 INJECTION, SOLUTION INTRAMUSCULAR; INTRAVENOUS ONCE
Status: COMPLETED | OUTPATIENT
Start: 2021-04-08 | End: 2021-04-08

## 2021-04-08 RX ORDER — FAMOTIDINE 20 MG/1
20 TABLET, FILM COATED ORAL ONCE
Status: COMPLETED | OUTPATIENT
Start: 2021-04-08 | End: 2021-04-08

## 2021-04-08 RX ORDER — LIDOCAINE HYDROCHLORIDE 10 MG/ML
0.1 INJECTION, SOLUTION EPIDURAL; INFILTRATION; INTRACAUDAL; PERINEURAL AS NEEDED
Status: DISCONTINUED | OUTPATIENT
Start: 2021-04-08 | End: 2021-04-08 | Stop reason: HOSPADM

## 2021-04-08 RX ORDER — SODIUM CHLORIDE 0.9 % (FLUSH) 0.9 %
5-40 SYRINGE (ML) INJECTION EVERY 8 HOURS
Status: DISCONTINUED | OUTPATIENT
Start: 2021-04-08 | End: 2021-04-08 | Stop reason: HOSPADM

## 2021-04-08 RX ORDER — NEOSTIGMINE METHYLSULFATE 1 MG/ML
INJECTION, SOLUTION INTRAVENOUS AS NEEDED
Status: DISCONTINUED | OUTPATIENT
Start: 2021-04-08 | End: 2021-04-08 | Stop reason: HOSPADM

## 2021-04-08 RX ORDER — SODIUM CHLORIDE, SODIUM LACTATE, POTASSIUM CHLORIDE, CALCIUM CHLORIDE 600; 310; 30; 20 MG/100ML; MG/100ML; MG/100ML; MG/100ML
50 INJECTION, SOLUTION INTRAVENOUS CONTINUOUS
Status: DISCONTINUED | OUTPATIENT
Start: 2021-04-08 | End: 2021-04-08 | Stop reason: HOSPADM

## 2021-04-08 RX ORDER — ROCURONIUM BROMIDE 10 MG/ML
INJECTION, SOLUTION INTRAVENOUS AS NEEDED
Status: DISCONTINUED | OUTPATIENT
Start: 2021-04-08 | End: 2021-04-08 | Stop reason: HOSPADM

## 2021-04-08 RX ORDER — FENTANYL CITRATE 50 UG/ML
100 INJECTION, SOLUTION INTRAMUSCULAR; INTRAVENOUS ONCE
Status: COMPLETED | OUTPATIENT
Start: 2021-04-08 | End: 2021-04-08

## 2021-04-08 RX ORDER — SODIUM CHLORIDE 0.9 % (FLUSH) 0.9 %
5-40 SYRINGE (ML) INJECTION AS NEEDED
Status: DISCONTINUED | OUTPATIENT
Start: 2021-04-08 | End: 2021-04-08 | Stop reason: HOSPADM

## 2021-04-08 RX ORDER — INSULIN LISPRO 100 [IU]/ML
INJECTION, SOLUTION INTRAVENOUS; SUBCUTANEOUS ONCE
Status: DISCONTINUED | OUTPATIENT
Start: 2021-04-08 | End: 2021-04-08 | Stop reason: HOSPADM

## 2021-04-08 RX ORDER — FENTANYL CITRATE 50 UG/ML
50 INJECTION, SOLUTION INTRAMUSCULAR; INTRAVENOUS AS NEEDED
Status: DISCONTINUED | OUTPATIENT
Start: 2021-04-08 | End: 2021-04-08 | Stop reason: HOSPADM

## 2021-04-08 RX ORDER — GLYCOPYRROLATE 0.2 MG/ML
INJECTION INTRAMUSCULAR; INTRAVENOUS AS NEEDED
Status: DISCONTINUED | OUTPATIENT
Start: 2021-04-08 | End: 2021-04-08 | Stop reason: HOSPADM

## 2021-04-08 RX ORDER — CEFAZOLIN SODIUM 2 G/50ML
2 SOLUTION INTRAVENOUS ONCE
Status: COMPLETED | OUTPATIENT
Start: 2021-04-08 | End: 2021-04-08

## 2021-04-08 RX ORDER — SODIUM CHLORIDE, SODIUM LACTATE, POTASSIUM CHLORIDE, CALCIUM CHLORIDE 600; 310; 30; 20 MG/100ML; MG/100ML; MG/100ML; MG/100ML
100 INJECTION, SOLUTION INTRAVENOUS CONTINUOUS
Status: DISCONTINUED | OUTPATIENT
Start: 2021-04-08 | End: 2021-04-08 | Stop reason: SDUPTHER

## 2021-04-08 RX ORDER — ONDANSETRON 2 MG/ML
INJECTION INTRAMUSCULAR; INTRAVENOUS AS NEEDED
Status: DISCONTINUED | OUTPATIENT
Start: 2021-04-08 | End: 2021-04-08 | Stop reason: HOSPADM

## 2021-04-08 RX ORDER — ROPIVACAINE HYDROCHLORIDE 5 MG/ML
0.5 INJECTION, SOLUTION EPIDURAL; INFILTRATION; PERINEURAL
Status: DISCONTINUED | OUTPATIENT
Start: 2021-04-08 | End: 2021-04-08 | Stop reason: HOSPADM

## 2021-04-08 RX ORDER — ROPIVACAINE HYDROCHLORIDE 5 MG/ML
INJECTION, SOLUTION EPIDURAL; INFILTRATION; PERINEURAL
Status: COMPLETED
Start: 2021-04-08 | End: 2021-04-08

## 2021-04-08 RX ORDER — HYDROMORPHONE HYDROCHLORIDE 2 MG/ML
0.5 INJECTION, SOLUTION INTRAMUSCULAR; INTRAVENOUS; SUBCUTANEOUS
Status: DISCONTINUED | OUTPATIENT
Start: 2021-04-08 | End: 2021-04-08 | Stop reason: HOSPADM

## 2021-04-08 RX ORDER — ONDANSETRON 2 MG/ML
4 INJECTION INTRAMUSCULAR; INTRAVENOUS ONCE
Status: DISCONTINUED | OUTPATIENT
Start: 2021-04-08 | End: 2021-04-08 | Stop reason: HOSPADM

## 2021-04-08 RX ORDER — SUCCINYLCHOLINE CHLORIDE 20 MG/ML
INJECTION INTRAMUSCULAR; INTRAVENOUS AS NEEDED
Status: DISCONTINUED | OUTPATIENT
Start: 2021-04-08 | End: 2021-04-08 | Stop reason: HOSPADM

## 2021-04-08 RX ORDER — DEXAMETHASONE SODIUM PHOSPHATE 4 MG/ML
INJECTION, SOLUTION INTRA-ARTICULAR; INTRALESIONAL; INTRAMUSCULAR; INTRAVENOUS; SOFT TISSUE AS NEEDED
Status: DISCONTINUED | OUTPATIENT
Start: 2021-04-08 | End: 2021-04-08 | Stop reason: HOSPADM

## 2021-04-08 RX ORDER — PROPOFOL 10 MG/ML
INJECTION, EMULSION INTRAVENOUS AS NEEDED
Status: DISCONTINUED | OUTPATIENT
Start: 2021-04-08 | End: 2021-04-08 | Stop reason: HOSPADM

## 2021-04-08 RX ORDER — ROPIVACAINE HYDROCHLORIDE 5 MG/ML
INJECTION, SOLUTION EPIDURAL; INFILTRATION; PERINEURAL
Status: COMPLETED | OUTPATIENT
Start: 2021-04-08 | End: 2021-04-08

## 2021-04-08 RX ADMIN — SUCCINYLCHOLINE CHLORIDE 120 MG: 20 INJECTION, SOLUTION INTRAMUSCULAR; INTRAVENOUS at 11:14

## 2021-04-08 RX ADMIN — FENTANYL CITRATE 100 MCG: 50 INJECTION, SOLUTION INTRAMUSCULAR; INTRAVENOUS at 11:14

## 2021-04-08 RX ADMIN — FAMOTIDINE 20 MG: 20 TABLET ORAL at 10:43

## 2021-04-08 RX ADMIN — FENTANYL CITRATE 100 MCG: 50 INJECTION, SOLUTION INTRAMUSCULAR; INTRAVENOUS at 10:57

## 2021-04-08 RX ADMIN — ROPIVACAINE HYDROCHLORIDE 30 ML: 5 INJECTION, SOLUTION EPIDURAL; INFILTRATION; PERINEURAL at 11:08

## 2021-04-08 RX ADMIN — DEXAMETHASONE SODIUM PHOSPHATE 4 MG: 4 INJECTION, SOLUTION INTRAMUSCULAR; INTRAVENOUS at 11:34

## 2021-04-08 RX ADMIN — GLYCOPYRROLATE 0.1 MG: 0.2 INJECTION INTRAMUSCULAR; INTRAVENOUS at 11:11

## 2021-04-08 RX ADMIN — PROPOFOL 20 MG: 10 INJECTION, EMULSION INTRAVENOUS at 12:44

## 2021-04-08 RX ADMIN — SODIUM CHLORIDE, SODIUM LACTATE, POTASSIUM CHLORIDE, AND CALCIUM CHLORIDE 75 ML/HR: 600; 310; 30; 20 INJECTION, SOLUTION INTRAVENOUS at 10:43

## 2021-04-08 RX ADMIN — ROCURONIUM BROMIDE 10 MG: 50 INJECTION INTRAVENOUS at 11:57

## 2021-04-08 RX ADMIN — LIDOCAINE HYDROCHLORIDE 4 ML: 10 INJECTION, SOLUTION EPIDURAL; INFILTRATION; INTRACAUDAL; PERINEURAL at 11:16

## 2021-04-08 RX ADMIN — ROCURONIUM BROMIDE 5 MG: 50 INJECTION INTRAVENOUS at 11:14

## 2021-04-08 RX ADMIN — GLYCOPYRROLATE 0.4 MG: 0.2 INJECTION INTRAMUSCULAR; INTRAVENOUS at 12:32

## 2021-04-08 RX ADMIN — LIDOCAINE HYDROCHLORIDE 100 MG: 20 INJECTION, SOLUTION EPIDURAL; INFILTRATION; INTRACAUDAL; PERINEURAL at 11:14

## 2021-04-08 RX ADMIN — Medication 3 MG: at 12:32

## 2021-04-08 RX ADMIN — ONDANSETRON 4 MG: 2 INJECTION INTRAMUSCULAR; INTRAVENOUS at 12:13

## 2021-04-08 RX ADMIN — FENTANYL CITRATE 25 MCG: 50 INJECTION, SOLUTION INTRAMUSCULAR; INTRAVENOUS at 12:44

## 2021-04-08 RX ADMIN — GLYCOPYRROLATE 0.1 MG: 0.2 INJECTION INTRAMUSCULAR; INTRAVENOUS at 11:12

## 2021-04-08 RX ADMIN — MIDAZOLAM 2 MG: 1 INJECTION INTRAMUSCULAR; INTRAVENOUS at 10:57

## 2021-04-08 RX ADMIN — PROPOFOL 200 MG: 10 INJECTION, EMULSION INTRAVENOUS at 11:14

## 2021-04-08 RX ADMIN — ROCURONIUM BROMIDE 25 MG: 50 INJECTION INTRAVENOUS at 11:28

## 2021-04-08 RX ADMIN — CEFAZOLIN SODIUM 2 G: 2 SOLUTION INTRAVENOUS at 11:20

## 2021-04-08 NOTE — PERIOP NOTES
Delirous  restless emergence form aneshtesia. Pt. swingingarms and feet with his eyes close. Reaching for nasal airway. Pt less combative and remains delirous as well as restlesswhen nasal airway removed.

## 2021-04-08 NOTE — DISCHARGE INSTRUCTIONS
ORTHOPAEDIC DISCHARGE PLAN     1. DISCHARGE DISPOSITION:  Home    2. FOLLOW UP RETURN TO OFFICE: 5 days    Call [phone] to confirm your appointment to see Dr. Tiki Burns MD.   Follow up with Primary Care Provider in 7 to 10 days. 3. WEIGHT BEARING STATUS/ROM:    NO WEIGHT BEARING TO   the Right LOWER EXTREMITY    4. ELEVATE the Right lower extremity on 1 pillow. Place the pillow horizontal so that no pressure is on the back of your heel    Please leave your current dressings and in place. Keep your dressings clean and dry to the: Right lower extremity      Please call [phone] (3 [facility]) if any: fever, shakes, chills, intractable pain, or for any questions you have regarding your care/medical condition   1. If you experience any calf pain or swelling, or are having any shortness of  breath, chest pain, or extremity swelling, or bleeding thru any surgical dressings,  or Bleeding at any body location while you are taking on any blood thinners. ie (mouth,nose, skin sites:)   2. Please go to closest ER  ASAP for assessment to rule out a leg clot and to  assess any  bleeding. 3. After general anesthesia or intravenous sedation, for 24 hours or while taking  prescription Narcotics:      [x]  Limit your activities     [x]   Do not drive and operate hazardous machinery    [x]   Do not make important personal or business decisions    [x]   Do  not drink alcoholic beverages    [x]  If you have not urinated within 8 hours after discharge, please contact    your surgeon on call.      Report the following to your surgeon: If any below is true         [x]   Excessive pain, swelling, redness or odor of or around the surgical area       [x]   Temperature over 100.5       [x]  Nausea and vomiting lasting longer than 4 hours or if unable to take medications       [x]    Any signs of decreased circulation or nerve impairment to extremity:    Change in color, persistent  numbness, tingling, coldness or increase pain          [x]  No smoking/ No tobacco products/ Avoid exposure to second hand smoke    5. DIET:  Regular Diet  OTC (Nutritional supplements/multivitamins/calcium w/ Vitamin  D     6. ACTIVITY:   // No lifting, twisting, squatting, deep bending. 7. INCISION CARE/DRESSINGS: Keep wound clean and dry ,ACE Wraps    Keep all pets away from  any wound present in order to prevent infection. 8. PAIN CONTROL: Prescriptions written: Narcotics       Start taking your pain medications when you get home    9. VTE prophylaxis : Start Aspirin on date // 4/8/2021 . 10. ANTIBIOTICS:    None at this time    11. DME/ PRESCRIPTIONS WRITTEN ALREADY WRITTEN:     Home Care Equipment:         Crutches for home use        NORCO  And  ASA RX SENT TO HIS PHARMACY already       Kris Mann MD  4/8/2021  1:25 PM        DISCHARGE SUMMARY from Nurse    PATIENT INSTRUCTIONS:    After general anesthesia or intravenous sedation, for 24 hours or while taking prescription Narcotics:  · Limit your activities  · Do not drive and operate hazardous machinery  · Do not make important personal or business decisions  · Do  not drink alcoholic beverages  · If you have not urinated within 8 hours after discharge, please contact your surgeon on call. Report the following to your surgeon:  · Excessive pain, swelling, redness or odor of or around the surgical area  · Temperature over 100.5  · Nausea and vomiting lasting longer than 4 hours or if unable to take medications  · Any signs of decreased circulation or nerve impairment to extremity: change in color, persistent  numbness, tingling, coldness or increase pain  · Any questions    What to do at Home:    May resume aspirin tomorrow. Recommended activity: Activity as tolerated and no driving for today. *  Please give a list of your current medications to your Primary Care Provider.     *  Please update this list whenever your medications are discontinued, doses are      changed, or new medications (including over-the-counter products) are added. *  Please carry medication information at all times in case of emergency situations. These are general instructions for a healthy lifestyle:    No smoking/ No tobacco products/ Avoid exposure to second hand smoke  Surgeon General's Warning:  Quitting smoking now greatly reduces serious risk to your health. Obesity, smoking, and sedentary lifestyle greatly increases your risk for illness    A healthy diet, regular physical exercise & weight monitoring are important for maintaining a healthy lifestyle    You may be retaining fluid if you have a history of heart failure or if you experience any of the following symptoms:  Weight gain of 3 pounds or more overnight or 5 pounds in a week, increased swelling in our hands or feet or shortness of breath while lying flat in bed. Please call your doctor as soon as you notice any of these symptoms; do not wait until your next office visit. The discharge information has been reviewed with the patient. The patient verbalized understanding. Discharge medications reviewed with the patient and appropriate educational materials and side effects teaching were provided.   ___________________________________________________________________________________________________________________________________

## 2021-04-08 NOTE — PERIOP NOTES
Spoke with Dr. Bakari Ellison same will call another perscription into [facility] as patient states that he only has 4-5 pills left.

## 2021-04-08 NOTE — PERIOP NOTES
Patient coughting up blood orally when nasla trumpet removed. Yankuer suction placed used for piedad amount of blood tinged secretions.

## 2021-04-08 NOTE — INTERVAL H&P NOTE
Update History & Physical    The Patient's History and Physical of April 8,   4/8/2021 10:52 AM   was reviewed with the patient and I examined the patient. There was no change. The surgical site was confirmed by the patient and me. Plan:  The risk, benefits, expected outcome, and alternative to the recommended procedure have been discussed with the patient. Patient understands and wants to proceed with the procedure.     Electronically signed by Mark Yañez MD on 4/8/2021 at 10:52 AM

## 2021-04-08 NOTE — ANESTHESIA PREPROCEDURE EVALUATION
Relevant Problems   No relevant active problems       Anesthetic History   No history of anesthetic complications            Review of Systems / Medical History  Patient summary reviewed and pertinent labs reviewed    Pulmonary  Within defined limits                 Neuro/Psych   Within defined limits           Cardiovascular  Within defined limits                     GI/Hepatic/Renal  Within defined limits              Endo/Other  Within defined limits           Other Findings              Physical Exam    Airway  Mallampati: II  TM Distance: 4 - 6 cm  Neck ROM: normal range of motion   Mouth opening: Normal     Cardiovascular               Dental         Pulmonary                 Abdominal  GI exam deferred       Other Findings            Anesthetic Plan    ASA: 1  Anesthesia type: general      Post-op pain plan if not by surgeon: peripheral nerve block single    Induction: Intravenous  Anesthetic plan and risks discussed with: Patient

## 2021-04-08 NOTE — OP NOTES
Patient: Vinayak Damian                MRN:@           SSN: [id number]   YOB: 1985         AGE: 38 y.o. SEX: male       OPERATIVE REPORT    Patient: Vinayak Damian  YOB: 1985  MRN: [id number]    Date of Procedure: 4/8/2021     Pre-Op Diagnosis: S86.011A RIGHT ACHILLES TENDON RUPTURE    Post-Op Diagnosis: Same as preoperative diagnosis. Procedure(s):  RIGHT ACHILLES TENDON PRIMARY REPAIR//POPLITEAL BLOCK    Surgeon(s):  Jimbo Downing MD    Surgical Assistant: Physician Assistant: (Unknown)  Surg Asst-1: (Unknown)    Elliott SCHULTZ:  ASSISTANT    Abundio Austin PA-C, [facility]  was medically necessary for the procedure. She assisted in : patient positioning, surgical incision retraction, placement of hardware, incision closure, placement of DME, and transfer of the patient to the PACU. Her role was critical for the success of this procedure. Anesthesia: General and Regional    Estimated Blood Loss (mL): 10 ml     IV FLUIDS:  700 ml IVF  Tourniquet Time:  26 minutes at  314  Mm HG     Complications: None    Specimens: * No specimens in log *     Implants: * No implants in log *    Drains: * No LDAs found *    Findings: complete rupture achilles tendon (proximal rupture)    Electronically Signed by Cristy Rivas MD on 4/8/2021 at 9:01 AM      OPERATIVE REPORT      OPERATIVE NOTE      Vinayak Damian was taken to the operating room on 4/8/2021. General anesthesia was performed. A Regional block yes was performed PRIOR TO PT COMING TO OR. A well padded right thigh tourniquet was placed on the Right well protected thigh (web roll cotton padded thigh). This patient was positioned prone after general anesthesia successful anesthesia was attained. The Right leg was thoroughly cleaned/prepped with: Chlorhexidine gluconate scrub and Chlorhexidine yellow prep stick (all tip of toes, entire foot, prepped all the way up to the tourniquet).     A formal time out was conducted: identifying the patient, the surgical location, the informed signed consent for procedure, and the signature was visible at the surgical field. It was also confirmed the patient did receive preoperative antibiotics. All surgical members agreed to the signed consent. The tourniquet was inflated with a pressure of 300 mm HG after the limb was exsanguinated. A typical 8 to 10 cm  was made medial to the Right achilles tendon. I made the incision through the skin, subcutaneous adipose and then I identified the paratenon of the achilles tendon. I cut the paratenon and then identified. I identified the achilles tendon. This is a Severe attenuation type tear (elongated 1.5 cm)complete tendon tear with shredded ends (distal/proximal locations). I performed a sharp  excision of this portion of the achilles tendon that was non functional at this rupture site. I performed a direct repair of the right Achilles tendon with a # 2  FIBER WIRE suture with a Wentworth suture technique by whip stitching each end of the tendon (peripheral aspect of the achilles tendon (on each end). I then used a free needle to pass the Fiber wire suture ends of the achilles tendon into the opposite end of the tendon. Specifically, the Fiber wire 2 ends of the distal achilles tendon were passed into the proximal achilles tendon stump, and the 2 ends of the fiber wire for the proximal achilles tendon was then passed into the distal achilles tendon stump (VIA LARGE FREE NEEDLE). Thus this repair was done exactly according the to \" Orthopaedic Gift Box\" type\" (Ezekiel Rdz MD technique). Excellent repair was conducted. . I then used O vicryl for additional repair of the posterior portion of the repair edge. This rupture was more proximal (close tho the myotendinous junction). I then repaired the central 1/3 tendon with O vicryl suture. I tested the repair and an excellent repair was attained.      I thoroughly irrigated the incision and selective electro cauterization was used for hemostasis. The tourniquet was deflated after 26 minutes of insufflation. I tested the repair and an excellent repair was attained with this suture technique. The incision was closed after selective electro cauterization was used. The incision was closed with:  3.0 Vicryl and 3.0 Monocryl  suture and 3.0 nylon suture. A well padded bivalved short leg bivalved fiberglass cast was placed with the foot in gentle plantar flexion. Chaim Prakash  was transferred to the recovery room without any complications. Kaitlin SCHULTZ:  ASSISTANT    Justyna Manjarrez PA-C, [facility]  was medically necessary for the procedure. She assisted in : patient positioning, surgical incision retraction, placement of hardware, incision closure, placement of DME, and transfer of the patient to the PACU. Her role was critical for the success of this procedure. I called below: all went well in surgery.       Name Rel to patient Home phone Discharge Info     Rd Hodges  Friend [phone] DISCHARGE CAREGIVER        Kym Valle MD  4/8/2021  9:14 AM

## 2021-04-08 NOTE — PERIOP NOTES
Given scrubs pants to patient. Dr. Nix Corners to send more pain medication to patient. Crutches in his car.

## 2021-04-08 NOTE — BRIEF OP NOTE
Brief Postoperative Note    Patient: Khalif Alcaraz  YOB: 1985  MRN: [id number]    Date of Procedure: 4/8/2021     Pre-Op Diagnosis: S86.011A RIGHT ACHILLES TENDON RUPTURE    Post-Op Diagnosis: Same as preoperative diagnosis. Procedure(s):  RIGHT ACHILLES TENDON PRIMARY REPAIR//POPLITEAL BLOCK    Surgeon(s):  Lucas Loja MD    Surgical Assistant: Physician Assistant: (Unknown)  Surg Asst-1: (Unknown)    Wicho SCHULTZ:  ASSISTANT    Efren Espinal PA-C, [facility]  was medically necessary for the procedure. She assisted in : patient positioning, surgical incision retraction, placement of hardware, incision closure, placement of DME, and transfer of the patient to the PACU. Her role was critical for the success of this procedure.      Anesthesia: General and Regional    Estimated Blood Loss (mL): 10 ml     IV FLUIDS:  700 ml IVF  Tourniquet Time:  26 minutes at  813  Mm HG     Complications: None    Specimens: * No specimens in log *     Implants: * No implants in log *    Drains: * No LDAs found *    Findings: complete rupture achilles tendon (proximal rupture)    Electronically Signed by Kris Mann MD on 4/8/2021 at 9:01 AM

## 2021-04-09 ENCOUNTER — TELEPHONE (OUTPATIENT)
Dept: ORTHOPEDIC SURGERY | Age: 36
End: 2021-04-09

## 2021-04-09 DIAGNOSIS — Z98.890 POST-OPERATIVE STATE: ICD-10-CM

## 2021-04-09 DIAGNOSIS — S86.011D RUPTURE OF RIGHT ACHILLES TENDON, SUBSEQUENT ENCOUNTER: Primary | ICD-10-CM

## 2021-04-09 RX ORDER — OXYCODONE AND ACETAMINOPHEN 7.5; 325 MG/1; MG/1
1-2 TABLET ORAL
Qty: 42 TAB | Refills: 0 | Status: SHIPPED | OUTPATIENT
Start: 2021-04-09 | End: 2021-04-13 | Stop reason: SDUPTHER

## 2021-04-09 NOTE — TELEPHONE ENCOUNTER
Patient called in regards to his previous surgery on 4/8. He stated the medication Hydrocodone-acetaminophen (Norco) 30 tab is not effective and he is still experiencing a severe amount of pain. He is requesting a new medication if possible.     Please advise patient  [phone]

## 2021-04-09 NOTE — ANESTHESIA POSTPROCEDURE EVALUATION
Procedure(s):  RIGHT ACHILLES TENDON PRIMARY REPAIR/ACHILLES TENDON REPAIR WITH GRAFT/POPLITEAL BLOCK.    general    Anesthesia Post Evaluation      Multimodal analgesia: multimodal analgesia used between 6 hours prior to anesthesia start to PACU discharge  Patient location during evaluation: PACU  Patient participation: complete - patient participated  Level of consciousness: awake and alert  Pain management: adequate  Airway patency: patent  Anesthetic complications: no  Cardiovascular status: acceptable  Respiratory status: acceptable  Hydration status: acceptable  Post anesthesia nausea and vomiting:  none  Final Post Anesthesia Temperature Assessment:  Normothermia (36.0-37.5 degrees C)      INITIAL Post-op Vital signs:   Vitals Value Taken Time   /69 04/08/21 1408   Temp 36.6 °C (97.9 °F) 04/08/21 1408   Pulse 75 04/08/21 1408   Resp 16 04/08/21 1408   SpO2 98 % 04/08/21 1408

## 2021-04-09 NOTE — TELEPHONE ENCOUNTER
The following prescriptions have been e-prescribed to the patients  pharmacy    Orders Placed This Encounter    oxyCODONE-acetaminophen (Percocet) 7.5-325 mg per tablet     Sig: Take 1-2 Tabs by mouth every eight (8) hours as needed for Pain for up to 7 days. Max Daily Amount: 6 Tabs. **FOR POST OPERATIVE PAIN**  Indications: pain     Dispense:  42 Tab     Refill:  0     This prescription replaces the Norco rx as it is not sufficiently controlling the patients post op pain           Autumn Crandall [facility], MPA, PA-C  4/9/2021  11:20 AM

## 2021-04-13 ENCOUNTER — TELEPHONE (OUTPATIENT)
Dept: ORTHOPEDIC SURGERY | Age: 36
End: 2021-04-13

## 2021-04-13 ENCOUNTER — OFFICE VISIT (OUTPATIENT)
Dept: ORTHOPEDIC SURGERY | Age: 36
End: 2021-04-13
Payer: MEDICAID

## 2021-04-13 VITALS — TEMPERATURE: 121 F | HEIGHT: 69 IN | WEIGHT: 164 LBS | OXYGEN SATURATION: 98 % | BODY MASS INDEX: 24.29 KG/M2

## 2021-04-13 DIAGNOSIS — Z98.890 POST-OPERATIVE STATE: ICD-10-CM

## 2021-04-13 DIAGNOSIS — S86.011D RUPTURE OF RIGHT ACHILLES TENDON, SUBSEQUENT ENCOUNTER: Primary | ICD-10-CM

## 2021-04-13 PROCEDURE — 99024 POSTOP FOLLOW-UP VISIT: CPT | Performed by: PHYSICIAN ASSISTANT

## 2021-04-13 RX ORDER — OXYCODONE AND ACETAMINOPHEN 7.5; 325 MG/1; MG/1
1-2 TABLET ORAL
Qty: 42 TAB | Refills: 0 | Status: SHIPPED | OUTPATIENT
Start: 2021-04-13 | End: 2021-04-20

## 2021-04-13 NOTE — PROGRESS NOTES
Debi Luis (1985) is a 38 y.o. male, established patient, 5 days s/p Right Achilles Tendon Primary Repair  /popliteal Block - Right completed on 4/8/2021, and he is here for evaluation of the following chief complaint(s):    Chief Complaint   Patient presents with    Ankle Pain     right ankle          ASSESSMENT & PLAN:     Diagnoses and all orders for this visit:    1. Rupture of right Achilles tendon, subsequent encounter  -     oxyCODONE-acetaminophen (Percocet) 7.5-325 mg per tablet; Take 1-2 Tabs by mouth every eight (8) hours as needed for Pain for up to 7 days. Max Daily Amount: 6 Tabs. **FOR POST OPERATIVE PAIN**  Indications: pain    2. Post-operative state  -     oxyCODONE-acetaminophen (Percocet) 7.5-325 mg per tablet; Take 1-2 Tabs by mouth every eight (8) hours as needed for Pain for up to 7 days. Max Daily Amount: 6 Tabs. **FOR POST OPERATIVE PAIN**  Indications: pain           ICD-10-CM ICD-9-CM   1. Rupture of right Achilles tendon, subsequent encounter  S86.011D V58.89     845.09   2. Post-operative state  Z98.890 V45.89       Patient Instructions         · Continue activity modification    · Weight bearing status:  no weight bearing to the surgical extremity    · No lifting, twisting, squatting, deep bending, driving or strenuous activity. · Please follow up as instructed    · Please take medication as directed    · Follow RICE protocol (Rest, Ice, Compression, Elevation). Please a covering over skin for protection     · Maintain proper Nutrition    · Take a multivitamin, calcium + Vitamin D supplement daily (if tolerated)    · If you are a current smoker, quit or limit smoking    · Keep the current dressings on and in place. You need to keep this incision clean and dry. If you have a splint or cast on please keep this clean and dry as well. · You should expect swelling and bruising in the area over the next several days.  You may elevate the surgical extremity on 1 pillow. Place the pillow horizontal so that no pressure is on the back of your heel. You may apply an icepack on top of the dressing to help minimize the swelling. PLEASE SEEK IMMEDIATE ASSESSMENT BY ER PHYSICIAN IF ANY OF THE FOLLOWING EXIST:      Excessive pain, swelling, redness or odor of or around the surgical area    Temperature over 100.5    Nausea and vomiting lasting longer than 4 hours or if unable to take medications    Any signs of decreased circulation or nerve impairment to extremity: change in color, persistent numbness, tingling, coldness or increase pain    If any calf pain, calf tightness, shortness of breath, chest pain    Any difficulty breathing at rest or with ambulation, any chest tightness/soreness   Severe intractable pain, persistent swelling or drainage, development of a wound, incisional redness, finger/toe swelling or color changes, or CALF PAIN    · Perform ankle pumps with non-surgical/non-injured extremity to help reduce the risk of blood clots    · Keep all pets away from  any wound present in order to prevent infection    · If you a reminder for a \"due or due soon\" health maintenance, please contact your primary care provider for follow-up on this health maintenance        Follow-up and Dispositions    · Return in about 3 weeks (around 5/4/2021). Follow-up and Disposition History           Dr. Helen Saldana has been consulted regarding the patient during this visit and he agrees with the assessment and plan    Patient expresses understanding of the diagnosis and treatment plan. Patient education has been provided. Patrick Batista may have a reminder for a \"due or due soon\" health maintenance. I have asked that he contact his primary care provider for follow-up on this health maintenance.  was reviewed by Carol Marquez PA-C on 4/13/2021.        SUBJECTIVE & OBJECTIVE:     HPI    Since last seen in the office, the patient reports that he is doing well, but states that [facility] pharmacy would not fill the Percocet prescription without prior authorization. We have not received a request for prior authorization but will check with [facility] [employer] [facility]. Patient denies any fever, chills, CP, SOB or calf pain. The patient denies any no new illness or injuries to report since last seen in the office. There are no reported changes in medications, allergies, social or family history. The patient is on ASA for DVT prophylaxis. Justin Rodrigues has been experiencing pain, discomfort and associated symptoms and has tried modalities of treatment and/or self treatment confirmed as outlined in the pain assessment below. Pain Assessment  4/13/2021   Location of Pain Ankle   Location Modifiers Right   Severity of Pain 6   Quality of Pain Aching   Quality of Pain Comment -   Duration of Pain Persistent   Frequency of Pain Constant   Aggravating Factors -   Limiting Behavior Yes   Relieving Factors Other (Comment)   Relieving Factors Comment rx pain medication          Justin Rodrigues  has a past medical history of Acne, Ankle sprain, Eczema, and Meningitis (1997). . Other than previously noted, patient reports no changes in medications, allergies, social or family history. REVIEW OF SYSTEMS:                  All Below are Negative except as indicated in the HPI: See HPI                Constitutional: negative for fever, chills, night sweats and unexpected weight loss and malaise/fatigue              HEENT: Negative. No hoarseness, no double vision              Respiratory: Negative.  No difficulty breathing, No SOB              Cardiovascular: negative for chest pain, claudication, RENTERIA. (-) Leg swelling               Gastrointestinal: Negative for pain, Blood in stool, incontinence, pelvic pain, N/V/C/D              Genitourinary: No saddle anesthesia, pelvic pain, blood in urine, incontinence, dysuria               Neurological: Negative for dizziness and weakness, visual changes, confusion, headaches, seizures               Phychiatric/Behavioral: Negative for depression, memory loss, substance abuse               Extremities: Negative for hair changes, rash, or skin lesion changes              Hematologic: Negative for bleeding problems, bruising, pallor or swollen lymph nodes              Peripheral Vascular: No calf pain, no circulation deficits              Musculoskeletal: As per HPI above      PHYSICAL EXAM:        Visit Vitals  Temp (!) 121 °F (49.4 °C)   Ht 5' 8.5\" (1.74 m)   Wt 164 lb (74.4 kg)   SpO2 98%   BMI 24.57 kg/m²         Constitutional: [x] Alert, cooperative, appears well-developed and well-nourished [x] No apparent distress      [] Abnormal - Body mass index is 24.57 kg/m². makes the treatment plan more complex     Mental status: [x] Alert and awake  [x] Oriented to person/place/time   [x] Normal mood/behavior/speech   [x] Normal dress/motor activity/thought processes/memory      [x] Able to follow commands    [] Abnormal - Dementia    Eyes:   EOM    [x]  Normal    [] Abnormal -   Sclera  [x]  Normal    [] Abnormal -          Discharge [x]  None visible   [] Abnormal -     HENT: [x] Normocephalic, atraumatic  [] Abnormal -   [x] Mouth/Throat: Mucous membranes are moist    External Ears [x] Normal, hearing intact  [] Abnormal -    Neck: [x] Supple.  No visualized mass, normal ROM [] Abnormal -     Pulmonary/Chest: [x] Respiratory effort normal   [x] No visualized signs of difficulty breathing or respiratory distress        [] Abnormal -        Cardiovascular/    [x] Normal pulses to each foot  [] Abnormal -   Peripheral Vascular:    Neurological:        [x] No Facial Asymmetry (Cranial nerve 7 motor function) (limited exam due to video visit) [x] No gross defects         [x] No gaze palsy        [] Abnormal -          Skin:        [x] No significant exanthematous lesions or discoloration noted on facial skin or visible areas       [] Abnormal -            Psychiatric: [x] Normal Affect, mood, judgement, behavior, conduct [] Abnormal -        [x] No Hallucinations      Musculoskeletal:   [x] Normal gait with no signs of ataxia         [x] Normal range of motion of neck        [] Abnormal -       MUSCULOSKELETAL: EXAMINATION OF:     ANKLE/FOOT right    DRESSINGS: CDI   DRAINAGE: none   INCISION: Incision looks good, skin well approximated, no dehiscence, nylon sutures in place without disruption. SKIN: Mild edema, no erythema, no ecchymosis, no warmth. No rash or skin lesions. No signs of infection or cellulitis present. TENDERNESS:  Mild tenderness to palpation right ankle  NEUROVASCULAR:  Grossly intact. Motor/Sensory: NL/NL. Positive distal pulses and capillary refill. DVT ASSESSMENT:  The calf is not tender to palpation. No evidence of DVT seen on physical exam.  LYMPHATICS: No extremity lymphedema, No calf swelling, no tenderness to calf muscles  JOINT MOTION: Not tested. There is pain-free range of motion of adjacent joints. Negative joint effusion  JOINT/TENDON STABILITY: No Ankle or Subtalar instability or joint laxity. No peroneal sublux ability or dislocation  ALIGNMENT: Forefoot, Midfoot, Hindfoot WNL. DEFORMITY: none present   ROM: not tested    An electronic signature was used to authenticate this note. -- Cass Harrington. Rolinda Goodpasture [facility], MORALES VANG  4/13/2021      Disclaimer: Sections of this note may have been dictated utilizing voice recognition software, which may have resulted in some phonetic based errors in grammar and contents. Even though attempts were made to correct all the mistakes, some may have been missed, and remained in the body of the document. If questions arise, please contact our department.        RADIOGRAPHS & DIAGNOSTIC STUDIES     None

## 2021-04-14 NOTE — TELEPHONE ENCOUNTER
Patient returned provider's call. I relayed the message. Patient understood he can pick his prescription up and the cost and he was ok with this and had no questions. He did say thank you very much for the assistance. No further action required.

## 2021-04-14 NOTE — TELEPHONE ENCOUNTER
I called the [facility] pharmacy and was advised that the Gregory Fare is probably the preferred medication of the patients insurance company. I explained to the pharmacist that the patient is post op the the Percocet provides better pain control for the patient. I asked the pharmacist the out of pocket cost of the Percocet prescription and she advised it would be $83. She offered me a discount card that brought the out of pocket cost down to $7.61. I attempted to contact the patient to advise that he can pay out of pocket for the prescription. Patients voicemail answered. Please contact the patient and advise he can  the prescription from the [facility] pharmacy for $7.61. Autumn Damon [facility], TAJ, PA-C  4/14/2021  1:00 PM

## 2021-05-03 RX ORDER — ASPIRIN 325 MG
TABLET ORAL
Qty: 30 TAB | Refills: 0 | Status: SHIPPED | OUTPATIENT
Start: 2021-05-03

## 2021-05-05 ENCOUNTER — OFFICE VISIT (OUTPATIENT)
Dept: ORTHOPEDIC SURGERY | Age: 36
End: 2021-05-05
Payer: MEDICAID

## 2021-05-05 VITALS
RESPIRATION RATE: 16 BRPM | HEART RATE: 72 BPM | TEMPERATURE: 97.3 F | HEIGHT: 68 IN | OXYGEN SATURATION: 97 % | BODY MASS INDEX: 24.94 KG/M2

## 2021-05-05 DIAGNOSIS — S86.011D RUPTURE OF RIGHT ACHILLES TENDON, SUBSEQUENT ENCOUNTER: Primary | ICD-10-CM

## 2021-05-05 DIAGNOSIS — Z98.890 POST-OPERATIVE STATE: ICD-10-CM

## 2021-05-05 DIAGNOSIS — R55 VASOVAGAL EPISODE: ICD-10-CM

## 2021-05-05 PROCEDURE — 99024 POSTOP FOLLOW-UP VISIT: CPT | Performed by: PHYSICIAN ASSISTANT

## 2021-05-05 NOTE — PROGRESS NOTES
Amalia Leavitt (1985) is a 38 y.o. male, established patient, 27 days s/p Right Achilles Tendon Primary Repair  /popliteal Block - Right completed on 4/8/2021, and he is here for evaluation of the following chief complaint(s):    Chief Complaint   Patient presents with    Ankle Pain     right ankle pain          ASSESSMENT & PLAN:     Diagnoses and all orders for this visit:    1. Rupture of right Achilles tendon, subsequent encounter    2. Post-operative state    3. Vasovagal episode           ICD-10-CM ICD-9-CM   1. Rupture of right Achilles tendon, subsequent encounter  S86.011D V58.89     845.09   2. Post-operative state  Z98.890 V45.89   3. Vasovagal episode  R55 780.2       Patient Instructions         · Continue activity modification    · Weight bearing status:  no weight bearing to the surgical extremity    · No lifting, twisting, squatting, deep bending, driving or strenuous activity. · Please follow up as instructed    · Please take medication as directed    · Follow RICE protocol (Rest, Ice, Compression, Elevation). Please a covering over skin for protection     · Maintain proper Nutrition    · Take a multivitamin, calcium + Vitamin D supplement daily (if tolerated)    · If you are a current smoker, quit or limit smoking    · Keep the current dressings on and in place. You need to keep this incision clean and dry. If you have a splint or cast on please keep this clean and dry as well. · You should expect swelling and bruising in the area over the next several days. You may elevate the surgical extremity on 1 pillow. Place the pillow horizontal so that no pressure is on the back of your heel. You may apply an icepack on top of the dressing to help minimize the swelling.      PLEASE SEEK IMMEDIATE ASSESSMENT BY ER PHYSICIAN IF ANY OF THE FOLLOWING EXIST:      Excessive pain, swelling, redness or odor of or around the surgical area    Temperature over 100.5    Nausea and vomiting lasting longer than 4 hours or if unable to take medications    Any signs of decreased circulation or nerve impairment to extremity: change in color, persistent numbness, tingling, coldness or increase pain    If any calf pain, calf tightness, shortness of breath, chest pain    Any difficulty breathing at rest or with ambulation, any chest tightness/soreness   Severe intractable pain, persistent swelling or drainage, development of a wound, incisional redness, finger/toe swelling or color changes, or CALF PAIN    · Perform ankle pumps with non-surgical/non-injured extremity to help reduce the risk of blood clots    · Keep all pets away from  any wound present in order to prevent infection    · If you a reminder for a \"due or due soon\" health maintenance, please contact your primary care provider for follow-up on this health maintenance        Follow-up and Dispositions    · Return in about 3 weeks (around 5/26/2021). Dr. Sybil Segura has been consulted regarding the patient during this visit and he agrees with the assessment and plan    Patient expresses understanding of the diagnosis and treatment plan. Patient education has been provided. Ainsley Andrade may have a reminder for a \"due or due soon\" health maintenance. I have asked that he contact his primary care provider for follow-up on this health maintenance.  was reviewed by Kem Duron PA-C on 5/5/2021. SUBJECTIVE & OBJECTIVE:          Since last seen in the office, the patient reports that he is doing okay. Patient denies any fever, chills, CP, SOB or calf pain. The patient denies any no new illness or injuries to report since last seen in the office. There are no reported changes in medications, allergies, social or family history. The patient is on ASA for DVT prophylaxis. But states that he has not been taking it consistently.  Patients sutures were removed in the office today and patient sustained a transient vasovagal episode during the process. Patients vitals were stable prior to the end of his office visit. Darling Valdez has been experiencing pain, discomfort and associated symptoms and has tried modalities of treatment and/or self treatment confirmed as outlined in the pain assessment below. Pain Assessment  5/5/2021   Location of Pain Ankle   Location Modifiers Right   Severity of Pain 3   Quality of Pain Dull   Quality of Pain Comment -   Duration of Pain Persistent   Frequency of Pain Constant   Aggravating Factors Standing;Walking   Limiting Behavior Yes   Relieving Factors Rest;NSAID   Relieving Factors Comment -   Result of Injury Yes   Work-Related Injury No          Darling Valdez  has a past medical history of Acne, Ankle sprain, Eczema, and Meningitis (1997). . Other than previously noted, patient reports no changes in medications, allergies, social or family history. REVIEW OF SYSTEMS:                  All Below are Negative except as indicated in the HPI: See HPI                Constitutional: negative for fever, chills, night sweats and unexpected weight loss and malaise/fatigue              HEENT: Negative. No hoarseness, no double vision              Respiratory: Negative.  No difficulty breathing, No SOB              Cardiovascular: negative for chest pain, claudication, RENTERIA. (-) Leg swelling               Gastrointestinal: Negative for pain, Blood in stool, incontinence, pelvic pain, N/V/C/D              Genitourinary: No saddle anesthesia, pelvic pain, blood in urine, incontinence, dysuria               Neurological: Negative for dizziness and weakness, visual changes, confusion, headaches, seizures               Phychiatric/Behavioral: Negative for depression, memory loss, substance abuse               Extremities: Negative for hair changes, rash, or skin lesion changes              Hematologic: Negative for bleeding problems, bruising, pallor or swollen lymph nodes Peripheral Vascular: No calf pain, no circulation deficits              Musculoskeletal: As per HPI above      PHYSICAL EXAM:        Visit Vitals  Pulse 72   Temp 97.3 °F (36.3 °C) (Temporal)   Resp 16   Ht 5' 8\" (1.727 m)   SpO2 97%   BMI 24.94 kg/m²         Constitutional: [x] Alert, cooperative, appears well-developed and well-nourished [x] No apparent distress      [] Abnormal - Body mass index is 24.94 kg/m². makes the treatment plan more complex     Mental status: [x] Alert and awake  [x] Oriented to person/place/time   [x] Normal mood/behavior/speech   [x] Normal dress/motor activity/thought processes/memory      [x] Able to follow commands    [] Abnormal - Dementia    Eyes:   EOM    [x]  Normal    [] Abnormal -   Sclera  [x]  Normal    [] Abnormal -          Discharge [x]  None visible   [] Abnormal -     HENT: [x] Normocephalic, atraumatic  [] Abnormal -   [x] Mouth/Throat: Mucous membranes are moist    External Ears [x] Normal, hearing intact  [] Abnormal -    Neck: [x] Supple.  No visualized mass, normal ROM [] Abnormal -     Pulmonary/Chest: [x] Respiratory effort normal   [x] No visualized signs of difficulty breathing or respiratory distress        [] Abnormal -        Cardiovascular/    [x] Normal pulses to each foot  [] Abnormal -   Peripheral Vascular:    Neurological:        [x] No Facial Asymmetry (Cranial nerve 7 motor function) (limited exam due to video visit) [x] No gross defects         [x] No gaze palsy        [] Abnormal -          Skin:        [x] No significant exanthematous lesions or discoloration noted on facial skin or visible areas       [] Abnormal -            Psychiatric:       [x] Normal Affect, mood, judgement, behavior, conduct [] Abnormal -        [x] No Hallucinations      Musculoskeletal:   [x] Normal gait with no signs of ataxia         [x] Normal range of motion of neck        [] Abnormal -       MUSCULOSKELETAL: EXAMINATION OF:     ANKLE/FOOT right    DRESSINGS: CDI DRAINAGE: none   INCISION: Incision looks good, skin well approximated, no dehiscence, nylon sutures in place without disruption. SKIN: Mild edema, no erythema, no ecchymosis, no warmth. No rash or skin lesions. No signs of infection or cellulitis present. TENDERNESS:  Mild tenderness to palpation right ankle  NEUROVASCULAR:  Grossly intact. Motor/Sensory: NL/NL. Positive distal pulses and capillary refill. DVT ASSESSMENT:  The calf is not tender to palpation. No evidence of DVT seen on physical exam.  LYMPHATICS: No extremity lymphedema, No calf swelling, no tenderness to calf muscles  JOINT MOTION: Not tested. There is pain-free range of motion of adjacent joints. Negative joint effusion  JOINT/TENDON STABILITY: No Ankle or Subtalar instability or joint laxity. No peroneal sublux ability or dislocation  ALIGNMENT: Forefoot, Midfoot, Hindfoot WNL. DEFORMITY: none present   ROM: not tested    An electronic signature was used to authenticate this note. -- Be Paniagua. [facility], MORALES VANG  5/5/2021      Disclaimer: Sections of this note may have been dictated utilizing voice recognition software, which may have resulted in some phonetic based errors in grammar and contents. Even though attempts were made to correct all the mistakes, some may have been missed, and remained in the body of the document. If questions arise, please contact our department.        RADIOGRAPHS & DIAGNOSTIC STUDIES     None

## 2021-05-19 ENCOUNTER — OFFICE VISIT (OUTPATIENT)
Dept: ORTHOPEDIC SURGERY | Age: 36
End: 2021-05-19
Payer: MEDICAID

## 2021-05-19 VITALS — HEART RATE: 99 BPM | RESPIRATION RATE: 16 BRPM | TEMPERATURE: 97.7 F | OXYGEN SATURATION: 98 %

## 2021-05-19 DIAGNOSIS — S86.011D RUPTURE OF RIGHT ACHILLES TENDON, SUBSEQUENT ENCOUNTER: Primary | ICD-10-CM

## 2021-05-19 DIAGNOSIS — Z98.890 POST-OPERATIVE STATE: ICD-10-CM

## 2021-05-19 PROCEDURE — 99024 POSTOP FOLLOW-UP VISIT: CPT | Performed by: PHYSICIAN ASSISTANT

## 2021-05-19 NOTE — PATIENT INSTRUCTIONS
 You may shower. NO soaking and no submerging in ANY water (no bath tubs, pools, hot tubs, oceans, etc)  Do not pick at your skin or the incision. Allow the incision to air when at rest (away from pets).  Pat dry after shower, no aggressive scrubbing or manipulation of the incision  Apply a gentle moisturizer with no fragrance (such as plain Vaseline) twice daily  NO weightbearing on the affected extremity. Remove one wedge every 4-5 days. Cover incision with large band aid or clean sock when wearing CAM boot to prevent irritation or friction against the incision Follow up as instructed or sooner as needed If you have a reminder for a \"due or due soon\" health maintenance, please contact your primary care provider for follow-up on this health maintenance. Autumn Sotelo [facility], MPA, PA-C 
5/19/2021 
3:40 PM

## 2021-05-19 NOTE — PROGRESS NOTES
Ainsley Andrade (1985) is a 38 y.o. male, established patient, 40 days s/p Right Achilles Tendon Primary Repair  /popliteal Block - Right completed on 4/8/2021, and he is here for evaluation of the following chief complaint(s):    Chief Complaint   Patient presents with    Surgical Follow-up     right ankle          ASSESSMENT & PLAN:     Diagnoses and all orders for this visit:    1. Rupture of right Achilles tendon, subsequent encounter  -     AMB SUPPLY ORDER  -     AMB SUPPLY ORDER    2. Post-operative state  -     AMB SUPPLY ORDER  -     AMB SUPPLY ORDER           ICD-10-CM ICD-9-CM   1. Rupture of right Achilles tendon, subsequent encounter  S86.011D V58.89     845.09   2. Post-operative state  Z98.890 V45.89       Patient Instructions        You may shower. NO soaking and no submerging in ANY water (no bath tubs, pools, hot tubs, oceans, etc)     Do not pick at your skin or the incision. Allow the incision to air when at rest (away from pets).  Pat dry after shower, no aggressive scrubbing or manipulation of the incision     Apply a gentle moisturizer with no fragrance (such as plain Vaseline) twice daily      NO weightbearing on the affected extremity. Remove one wedge every 4-5 days. Cover incision with large band aid or clean sock when wearing CAM boot to prevent irritation or friction against the incision    Follow up as instructed or sooner as needed      Dr. Sybil Segura has been consulted regarding the patient during this visit and he agrees with the assessment and plan    Patient expresses understanding of the diagnosis and treatment plan. Patient education has been provided. Ainsley Andrade may have a reminder for a \"due or due soon\" health maintenance. I have asked that he contact his primary care provider for follow-up on this health maintenance.  was reviewed by Kem Duron PA-C on 5/19/2021.        SUBJECTIVE & OBJECTIVE:     Since last seen in the office, the patient reports that he is doing okay. Patient denies any fever, chills, CP, SOB or calf pain. The patient denies any no new illness or injuries to report since last seen in the office. There are no reported changes in medications, allergies, social or family history. Oskar Tripp has been experiencing pain, discomfort and associated symptoms and has tried modalities of treatment and/or self treatment confirmed as outlined in the pain assessment below. Pain Assessment  5/19/2021   Location of Pain Ankle   Location Modifiers Right   Severity of Pain 0   Quality of Pain Dull   Quality of Pain Comment -   Duration of Pain -   Frequency of Pain -   Aggravating Factors Standing;Walking   Limiting Behavior -   Relieving Factors Elevation   Relieving Factors Comment -   Result of Injury -   Work-Related Injury -          Oskar Tripp  has a past medical history of Acne, Ankle sprain, Eczema, and Meningitis (1997). . Other than previously noted, patient reports no changes in medications, allergies, social or family history. REVIEW OF SYSTEMS:                  All Below are Negative except as indicated in the HPI: See HPI                Constitutional: negative for fever, chills, night sweats and unexpected weight loss and malaise/fatigue              HEENT: Negative. No hoarseness, no double vision              Respiratory: Negative.  No difficulty breathing, No SOB              Cardiovascular: negative for chest pain, claudication, RENTERIA. (-) Leg swelling               Gastrointestinal: Negative for pain, Blood in stool, incontinence, pelvic pain, N/V/C/D              Genitourinary: No saddle anesthesia, pelvic pain, blood in urine, incontinence, dysuria               Neurological: Negative for dizziness and weakness, visual changes, confusion, headaches, seizures               Phychiatric/Behavioral: Negative for depression, memory loss, substance abuse               Extremities: Negative for hair changes, rash, or skin lesion changes              Hematologic: Negative for bleeding problems, bruising, pallor or swollen lymph nodes              Peripheral Vascular: No calf pain, no circulation deficits              Musculoskeletal: As per HPI above      PHYSICAL EXAM:        Visit Vitals  Pulse 99   Temp 97.7 °F (36.5 °C)   Resp 16   SpO2 98%         Constitutional: [x] Alert, cooperative, appears well-developed and well-nourished [x] No apparent distress      [] Abnormal - There is no height or weight on file to calculate BMI. makes the treatment plan more complex     Mental status: [x] Alert and awake  [x] Oriented to person/place/time   [x] Normal mood/behavior/speech   [x] Normal dress/motor activity/thought processes/memory      [x] Able to follow commands    [] Abnormal - Dementia    Eyes:   EOM    [x]  Normal    [] Abnormal -   Sclera  [x]  Normal    [] Abnormal -          Discharge [x]  None visible   [] Abnormal -     HENT: [x] Normocephalic, atraumatic  [] Abnormal -   [x] Mouth/Throat: Mucous membranes are moist    External Ears [x] Normal, hearing intact  [] Abnormal -    Neck: [x] Supple.  No visualized mass, normal ROM [] Abnormal -     Pulmonary/Chest: [x] Respiratory effort normal   [x] No visualized signs of difficulty breathing or respiratory distress        [] Abnormal -        Cardiovascular/    [x] Normal pulses to each foot  [] Abnormal -   Peripheral Vascular:    Neurological:        [x] No Facial Asymmetry (Cranial nerve 7 motor function) (limited exam due to video visit) [x] No gross defects         [x] No gaze palsy        [] Abnormal -          Skin:        [x] No significant exanthematous lesions or discoloration noted on facial skin or visible areas       [] Abnormal -            Psychiatric:       [x] Normal Affect, mood, judgement, behavior, conduct [] Abnormal -        [x] No Hallucinations      Musculoskeletal:   [x] Normal gait with no signs of ataxia [x] Normal range of motion of neck        [] Abnormal -       MUSCULOSKELETAL: EXAMINATION OF:     ANKLE/FOOT right    DRESSINGS: CDI   DRAINAGE: none   INCISION: Incision looks good, skin well approximated, no dehiscence. SKIN: Mild edema, no erythema, no ecchymosis, no warmth. No rash or skin lesions. No signs of infection or cellulitis present. Dry, peeling skin  TENDERNESS:  Mild tenderness to palpation right ankle  NEUROVASCULAR:  Grossly intact. Motor/Sensory: NL/NL. Positive distal pulses and capillary refill. DVT ASSESSMENT:  The calf is not tender to palpation. No evidence of DVT seen on physical exam.  LYMPHATICS: No extremity lymphedema, No calf swelling, no tenderness to calf muscles  JOINT MOTION: Not tested. There is pain-free range of motion of adjacent joints. Negative joint effusion  JOINT/TENDON STABILITY: No Ankle or Subtalar instability or joint laxity. No peroneal sublux ability or dislocation  ALIGNMENT: Forefoot, Midfoot, Hindfoot WNL. DEFORMITY: none present   ROM: not tested    An electronic signature was used to authenticate this note. -- Sydnie Beckham. [facility], TAJ, PA-C  5/19/2021      Disclaimer: Sections of this note may have been dictated utilizing voice recognition software, which may have resulted in some phonetic based errors in grammar and contents. Even though attempts were made to correct all the mistakes, some may have been missed, and remained in the body of the document. If questions arise, please contact our department.        RADIOGRAPHS & DIAGNOSTIC STUDIES     None

## 2022-02-02 ENCOUNTER — OFFICE VISIT (OUTPATIENT)
Dept: ORTHOPEDIC SURGERY | Age: 37
End: 2022-02-02
Payer: MEDICAID

## 2022-02-02 VITALS
TEMPERATURE: 98.7 F | OXYGEN SATURATION: 98 % | HEART RATE: 90 BPM | HEIGHT: 68 IN | WEIGHT: 166.8 LBS | BODY MASS INDEX: 25.28 KG/M2

## 2022-02-02 DIAGNOSIS — S86.011D RUPTURE OF RIGHT ACHILLES TENDON, SUBSEQUENT ENCOUNTER: Primary | ICD-10-CM

## 2022-02-02 PROCEDURE — 99213 OFFICE O/P EST LOW 20 MIN: CPT | Performed by: ORTHOPAEDIC SURGERY

## 2022-02-02 NOTE — PROGRESS NOTES
AMBULATORY PROGRESS NOTE      Patient: Lio Van             MRN: [id number]     SSN: [id number] Body mass index is 25.36 kg/m². YOB: 1985     AGE: 39 y.o. EX: male    PCP: None       IMPRESSION //  DIAGNOSIS AND TREATMENT PLAN        Lio Van has a diagnosis of:      He is here to assess his right hindfoot. Along his Achilles tendon, is 1 single nylon stitch, that is remains. In assessing this there is no redness erythema no drainage no signs infection I easily pulled his nylon stitch out, as it was 60% out and 40% still in. He states he noticed this, several weeks ago, never had any fever shakes chills night sweats or any drainage. On assessment, he is nontender is full range of motion notes excellent strength with Achilles tendon can single stance rise double stance rise in otherwise along the surgical incision, posteriorly, closest to me, I can see easily see the nylon stitch. This was easily removed gently pulled and it came out so again 40% of this nylon stitch remained in. DIAGNOSES    1. Rupture of right Achilles tendon, subsequent encounter        No orders of the defined types were placed in this encounter. PLAN:    1. PRN  2.      RTO PRN    There are no Patient Instructions on file for this visit. Please follow up with your PCP for any health maintenance as recommended         Lio Van  expresses understanding of the diagnosis, treatment plan, and all of their proposed questions were answered to their satisfaction. Patient education has been provided re the diagnoses.          HPI //  [address] A 39 y.o. male who is a/an  establishedestablished patient, presenting to my outpatient office for evaluation of  the following chief complaint(s):     Chief Complaint   Patient presents with    Ankle Pain     RT achilles tendon follow up      [address] PRIMARY REPAIR /POPLITEAL BLOCK  4/8/2021 11:10 AM  [facility] MAIN OR  [facility] MAIN 07   Cj Velasquez MD    Orthopedics       At [address] was seen and evaluated by my PA, Dian Crow. He is here to assess his right hindfoot. Along his Achilles tendon, is 1 single nylon stitch, that is remains. In assessing this there is no redness erythema no drainage no signs infection I easily pulled his nylon stitch out, as it was 60% out and 40% still in. He states he noticed this, several weeks ago, never had any fever shakes chills night sweats or any drainage. On assessment, he is nontender is full range of motion notes excellent strength with Achilles tendon can single stance rise double stance rise in otherwise along the surgical incision, posteriorly, closest to me, I can see easily see the nylon stitch. This was easily removed gently pulled and it came out so again 40% of this nylon stitch remained in. Visit Vitals  Pulse 90   Temp 98.7 °F (37.1 °C) (Temporal)   Ht 5' 8\" (1.727 m)   Wt 166 lb 12.8 oz (75.7 kg)   SpO2 98%   BMI 25.36 kg/m²       Appearance: Alert, well appearing and pleasant patient who is in no distress, oriented to person, place/time, and who follows commands. Normal dress/motor activity/thought processes/memory. This patient is accompanied in the examination room by his  self. Patient arrives to office via: without assistive device:      Psychiatric:  Normal Affect/mood. Judgement, behavior, and conduct are appropriate. Speech normal in context and clarity, memory intact grossly, no involuntary movements - tremors. H EENT (2): Head normocephalic & atraumatic.   Eye: pupils are round// EOM are intact // Neck: ROM WNL  // Hearings Intact   Respiratory: Breathing non labored     ANKLE/FOOT right    Gait: normal  Tenderness: no    Cutaneous: Well-healed, surgical scar, slight hypertrophy central portion surgical well-healed incision no redness erythema no drainage no signs infection   Joint Motion:   WNL   Joint / Tendon Stability:  No Ankle or Subtalar instability or joint laxity. No peroneal sublux ability or dislocation  Alignment: neutral Hindfoot,    Neuro Motor/Sensory: NL/NL  Vascular: NL foot/ankle pulses,   Lymphatics: No extremity lymphedema, No calf swelling, no tenderness to calf muscles. CHART REVIEW     Rhonda Henson has been experiencing pain and discomfort confirmed as outlined in the pain assessment outlined below.  was reviewed by Molly Garg MD on 2/2/2022. Pain Assessment  2/2/2022   Location of Pain Ankle   Location Modifiers Right   Severity of Pain 1   Quality of Pain Other (Comment)   Quality of Pain Comment Discomfort   Duration of Pain Persistent   Frequency of Pain Several times daily   Aggravating Factors Other (Comment); Walking   Aggravating Factors Comment Something rubbing against it   Limiting Behavior -   Relieving Factors Rest   Relieving Factors Comment -   Result of Injury -   Work-Related Injury -        Rhonda Henson  has a past medical history of Acne, Ankle sprain, Eczema, and Meningitis (1997). Patients is employed at:         Past Medical History:   Diagnosis Date    Acne     Ankle sprain     Eczema     Meningitis 1997     Past Surgical History:   Procedure Laterality Date    HX SKIN BIOPSY      leg     Current Outpatient Medications   Medication Sig    aspirin (ASPIRIN) 325 mg tablet TAKE 1 TABLET BY MOUTH EVERY DAY**NOT COVERED (Patient not taking: Reported on 2/2/2022)    HYDROcodone-acetaminophen (NORCO) 7.5-325 mg per tablet Take 1 Tab by mouth every six (6) hours as needed for Pain. (Patient not taking: Reported on 2/2/2022)     No current facility-administered medications for this visit.      No Known Allergies  Social History     Occupational History    Not on file   Tobacco Use    Smoking status: Passive Smoke Exposure - Never Smoker    Smokeless tobacco: Never Used   Vaping Use    Vaping Use: Never used   Substance and Sexual Activity    Alcohol use: Yes     Comment: rarely    Drug use: Yes     Types: Marijuana    Sexual activity: Not on file     History reviewed. No pertinent family history. DIAGNOSTIC LAB DATA      No results found for: HBA1C, IPI5QPZT, FPO8TTQN //   Lab Results   Component Value Date/Time    Glucose 90 04/08/2021 10:36 AM        No results found for: THH6XWJG, PMY1PIQP      No results found for: VITD3, XQVID2, XQVID3, Helyn Ding, VD3RIA, WJNX09QVKMZ     Drug Screen Most Recent Result Date     DRUG SCREEN, URINE  Collected: 4/8/2021  9:55 AM (Final result)    Complete Results                  REVIEW OF SYSTEMS : 2/2/2022  ALL BELOW ARE Negative except : SEE HPI     All other systems reviewed and are negative. 12 point review of systems otherwise negative unless noted in HPI. RADIOGRAPHS// IMAGING//DIAGNOSTIC DATA     No orders of the defined types were placed in this encounter. I have personally reviewed the images of the above study. The interpretation of this study is my professional opinion            I have spent 30 minutes reviewing the previous notes, reviewing diagnostic studies [Advanced  Imaging, Diagnostic test results (x-rays)] and had a direct face to face with the patient discussing the diagnosis and importance of compliance with the treatment and plan. There is  discussion for the potential for surgery, answering all questions, as well as documenting patient care coordination for this individual on the day of the visit. Disclaimer:     Sections of this note are dictated using utilizing voice recognition software, which may have resulted in some phonetic based errors in grammar and contents. Even though attempts were made to correct all the mistakes, some may have been missed, and remained in the body of the document. If questions arise, please contact our department.      An electronic signature was used to authenticate this note. Sue Busch may have a reminder for a \"due or due soon\" health maintenance. I have asked that he contact his primary care provider for follow-up on this health maintenance. There are no Patient Instructions on file for this visit. Please follow up with your PCP for any health maintenance as recommended.               Lisa Thompson, as dictated byJames  2/2/2022  8:48 AM

## 2022-10-28 NOTE — PATIENT INSTRUCTIONS
· Continue activity modification · Weight bearing status:  no weight bearing to the surgical extremity · No lifting, twisting, squatting, deep bending, driving or strenuous activity. · Please follow up as instructed · Please take medication as directed · Follow RICE protocol (Rest, Ice, Compression, Elevation). Please a covering over skin for protection · Maintain proper Nutrition · Take a multivitamin, calcium + Vitamin D supplement daily (if tolerated) · If you are a current smoker, quit or limit smoking · Keep the current dressings on and in place. You need to keep this incision clean and dry. If you have a splint or cast on please keep this clean and dry as well. · You should expect swelling and bruising in the area over the next several days. You may elevate the surgical extremity on 1 pillow. Place the pillow horizontal so that no pressure is on the back of your heel. You may apply an icepack on top of the dressing to help minimize the swelling. PLEASE SEEK IMMEDIATE ASSESSMENT BY ER PHYSICIAN IF ANY OF THE FOLLOWING EXIST:  
 
 Excessive pain, swelling, redness or odor of or around the surgical area  Temperature over 100.5  Nausea and vomiting lasting longer than 4 hours or if unable to take medications  Any signs of decreased circulation or nerve impairment to extremity: change in color, persistent numbness, tingling, coldness or increase pain  If any calf pain, calf tightness, shortness of breath, chest pain  Any difficulty breathing at rest or with ambulation, any chest tightness/soreness  Severe intractable pain, persistent swelling or drainage, development of a wound, incisional redness, finger/toe swelling or color changes, or CALF PAIN 
 
· Perform ankle pumps with non-surgical/non-injured extremity to help reduce the risk of blood clots · Keep all pets away from  any wound present in order to prevent infection · If you a reminder for a \"due or due soon\" health maintenance, please contact your primary care provider for follow-up on this health maintenance Acute Pain After Surgery: Care Instructions Your Care Instructions It's common to have some pain after surgery. Pain doesn't mean that something is wrong or that the surgery didn't go well. But when the pain is severe, it's important to work with your doctor to manage it. It's also important to be aware of a few facts about pain and pain medicine. · You are the only person who knows what your pain feels like. So be sure to tell your doctor when you are in pain or when the pain changes. Then he or she will know how to adjust your medicines. · Pain is often easier to control right after it starts. So it may be better to take regular doses of pain medicine and not wait until the pain gets bad. · Medicine can help control pain. But this doesn't mean you'll have no pain. Medicine works to keep the pain at a level you can live with. With time, you will feel better. Follow-up care is a key part of your treatment and safety. Be sure to make and go to all appointments, and call your doctor if you are having problems. It's also a good idea to know your test results and keep a list of the medicines you take. How can you care for yourself at home? · Be safe with medicines. Read and follow all instructions on the label. ? If the doctor gave you a prescription medicine for pain, take it as prescribed. ? If you are not taking a prescription pain medicine, ask your doctor if you can take an over-the-counter medicine. · If you take an over-the-counter pain medicine, such as acetaminophen (Tylenol), ibuprofen (Advil, Motrin), or naproxen (Aleve), read and follow all instructions on the label. · Do not take two or more pain medicines at the same time unless the doctor told you to. · Do not drink alcohol while you are taking pain medicines. · Try to walk each day if your doctor recommends it. Start by walking a little more than you did the day before. Bit by bit, increase the amount you walk. Walking increases blood flow. It also helps prevent pneumonia and constipation. · To prevent constipation from opioid pain medicines: 
? Talk to your doctor about a laxative. ? Include fruits, vegetables, beans, and whole grains in your diet each day. These foods are high in fiber. ? Drink plenty of fluids, enough so that your urine is light yellow or clear like water. Drink water, fruit juice, or other drinks that do not contain caffeine or alcohol. If you have kidney, heart, or liver disease and have to limit fluids, talk with your doctor before you increase the amount of fluids you drink. ? Take a fiber supplement, such as Citrucel or Metamucil, every day if needed. Read and follow all instructions on the label. If you take pain medicine for more than a few days, talk to your doctor before you take fiber. yes

## 2023-02-28 ENCOUNTER — OFFICE VISIT (OUTPATIENT)
Age: 38
End: 2023-02-28
Payer: COMMERCIAL

## 2023-02-28 VITALS — HEIGHT: 68 IN | BODY MASS INDEX: 24.55 KG/M2 | WEIGHT: 162 LBS | TEMPERATURE: 97.8 F

## 2023-02-28 DIAGNOSIS — M89.9 LESION OF RIGHT HUMERUS: Primary | ICD-10-CM

## 2023-02-28 DIAGNOSIS — M25.511 RIGHT SHOULDER PAIN, UNSPECIFIED CHRONICITY: ICD-10-CM

## 2023-02-28 PROCEDURE — 99203 OFFICE O/P NEW LOW 30 MIN: CPT | Performed by: ORTHOPAEDIC SURGERY

## 2023-02-28 PROCEDURE — 73030 X-RAY EXAM OF SHOULDER: CPT | Performed by: ORTHOPAEDIC SURGERY

## 2023-02-28 RX ORDER — ASPIRIN 325 MG
TABLET ORAL
COMMUNITY
Start: 2021-05-03

## 2023-02-28 RX ORDER — HYDROCODONE BITARTRATE AND ACETAMINOPHEN 7.5; 325 MG/1; MG/1
1 TABLET ORAL EVERY 6 HOURS PRN
COMMUNITY

## 2023-03-08 RX ORDER — MELOXICAM 15 MG/1
15 TABLET ORAL DAILY
Qty: 30 TABLET | Refills: 3 | Status: SHIPPED | OUTPATIENT
Start: 2023-03-08

## 2023-03-13 ENCOUNTER — HOSPITAL ENCOUNTER (OUTPATIENT)
Facility: HOSPITAL | Age: 38
Discharge: HOME | End: 2023-03-16
Payer: MEDICAID

## 2023-03-13 DIAGNOSIS — M25.511 RIGHT SHOULDER PAIN, UNSPECIFIED CHRONICITY: ICD-10-CM

## 2023-03-13 DIAGNOSIS — M89.9 LESION OF RIGHT HUMERUS: ICD-10-CM

## 2023-03-13 PROCEDURE — 6360000004 HC RX CONTRAST MEDICATION: Performed by: ORTHOPAEDIC SURGERY

## 2023-03-13 PROCEDURE — A9577 INJ MULTIHANCE: HCPCS | Performed by: ORTHOPAEDIC SURGERY

## 2023-03-13 PROCEDURE — 73220 MRI UPPR EXTREMITY W/O&W/DYE: CPT

## 2023-03-13 RX ADMIN — GADOBENATE DIMEGLUMINE 15 ML: 529 INJECTION, SOLUTION INTRAVENOUS at 17:08

## 2023-03-22 ENCOUNTER — OFFICE VISIT (OUTPATIENT)
Age: 38
End: 2023-03-22

## 2023-03-22 VITALS — BODY MASS INDEX: 24.44 KG/M2 | WEIGHT: 165 LBS | TEMPERATURE: 97.8 F | HEIGHT: 69 IN

## 2023-03-22 DIAGNOSIS — M79.601 RIGHT ARM PAIN: ICD-10-CM

## 2023-03-22 DIAGNOSIS — M61.011: Primary | ICD-10-CM

## 2023-03-22 RX ORDER — MELOXICAM 15 MG/1
15 TABLET ORAL DAILY
Qty: 30 TABLET | Refills: 3 | Status: SHIPPED | OUTPATIENT
Start: 2023-03-22
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
## 2023-04-20 ENCOUNTER — OFFICE VISIT (OUTPATIENT)
Age: 38
End: 2023-04-20

## 2023-04-20 ENCOUNTER — CLINICAL DOCUMENTATION (OUTPATIENT)
Age: 38
End: 2023-04-20

## 2023-04-20 VITALS — WEIGHT: 165 LBS | HEIGHT: 69 IN | BODY MASS INDEX: 24.44 KG/M2

## 2023-04-20 DIAGNOSIS — M61.011: Primary | ICD-10-CM

## 2023-04-20 NOTE — PROGRESS NOTES
Patient left authorization to disclose health information for x-rays and imaging reports, bills, progress notes.
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
## (undated) DEVICE — SOLUTION IV 1000ML 0.9% SOD CHL

## (undated) DEVICE — 3 ML SYRINGE WITH HYPODERMIC SAFETY NEEDLE: Brand: MAGELLAN

## (undated) DEVICE — SUTURE VCRL SZ 2-0 L27IN ABSRB UD L26MM SH 1/2 CIR J417H

## (undated) DEVICE — GAUZE,SPONGE,4"X4",16PLY,STRL,LF,10/TRAY: Brand: MEDLINE

## (undated) DEVICE — SUTURE VCRL SZ 3-0 L27IN ABSRB UD L26MM SH 1/2 CIR J416H

## (undated) DEVICE — PADDING CAST SOF-ROL 4INX4YD -- NS

## (undated) DEVICE — STERILE POLYISOPRENE POWDER-FREE SURGICAL GLOVES: Brand: PROTEXIS

## (undated) DEVICE — INTENDED FOR TISSUE SEPARATION, AND OTHER PROCEDURES THAT REQUIRE A SHARP SURGICAL BLADE TO PUNCTURE OR CUT.: Brand: BARD-PARKER ® STAINLESS STEEL BLADES

## (undated) DEVICE — Z DISCONTINUED BY MEDLINE USE 2711682 TRAY SKIN PREP DRY W/ PREM GLV

## (undated) DEVICE — PREP SKN CHLRAPRP APL 26ML STR --

## (undated) DEVICE — BLADE SAW W9XL31MM THK038MM CUT THK043MM REPL SAG OSC THN

## (undated) DEVICE — PAD,ABDOMINAL,8"X10",ST,LF: Brand: MEDLINE

## (undated) DEVICE — SUT VCRL + 0 27IN CT1 UD --

## (undated) DEVICE — NEEDLE SUT SZ 1 1/2 CIR CATGUT DISP TAPR PT MAYO

## (undated) DEVICE — TAPE CST LT 4INX4YD FBRGLS WHT --

## (undated) DEVICE — SUTURE FIBERWIRE SZ 0 L38IN NONABSORBABLE BLU L22.2MM 1/2 AR7250

## (undated) DEVICE — PACK SURG BSHR TOT KNEE LF

## (undated) DEVICE — BANDAGE COMPR EXSANGUATION SGL LAYERED NO CLSR 9FT LEN 4IN W

## (undated) DEVICE — SUT ETHLN 3-0 18IN PS1 BLK --

## (undated) DEVICE — PADDING CAST W4INXL4YD ST COT COHESIVE HND TEARABLE SPEC

## (undated) DEVICE — NEEDLE NRV BLK 22GA L2IN 30DEG INSUL BVL EXTN SET STIMUPLEX

## (undated) DEVICE — DISPOSABLE TOURNIQUET CUFF SINGLE BLADDER, DUAL PORT AND QUICK CONNECT CONNECTOR: Brand: COLOR CUFF

## (undated) DEVICE — DRESSING,GAUZE,XEROFORM,CURAD,1"X8",ST: Brand: CURAD

## (undated) DEVICE — (D)LUB GEL SURG SURGLUB 2OZ -- DISC BY MFR USE ITEM 292507

## (undated) DEVICE — BLANKET WRM W29.9XL79.1IN UP BODY FORC AIR MISTRAL-AIR

## (undated) DEVICE — REM POLYHESIVE ADULT PATIENT RETURN ELECTRODE: Brand: VALLEYLAB

## (undated) DEVICE — BLANKET WRM AD PREM MISTRAL-AIR

## (undated) DEVICE — GARMENT,MEDLINE,DVT,INT,CALF,FOAM,MED: Brand: MEDLINE

## (undated) DEVICE — BNDG ELAS HK LOOP 4X5YD NS -- MATRIX

## (undated) DEVICE — BANDAGE COBAN 4 IN COMPR W4INXL5YD FOAM COHESIVE QUIK STK SELF ADH SFT

## (undated) DEVICE — Device: Brand: PILLOW, GENTLETOUCH, 7 INCH RT

## (undated) DEVICE — 450 ML BOTTLE OF 0.05% CHLORHEXIDINE GLUCONATE IN 99.95% STERILE WATER FOR IRRIGATION, USP AND APPLICATOR.: Brand: IRRISEPT ANTIMICROBIAL WOUND LAVAGE